# Patient Record
Sex: FEMALE | Race: WHITE | HISPANIC OR LATINO | Employment: UNEMPLOYED | URBAN - METROPOLITAN AREA
[De-identification: names, ages, dates, MRNs, and addresses within clinical notes are randomized per-mention and may not be internally consistent; named-entity substitution may affect disease eponyms.]

---

## 2019-06-11 ENCOUNTER — HOSPITAL ENCOUNTER (EMERGENCY)
Facility: HOSPITAL | Age: 39
Discharge: HOME | End: 2019-06-11
Attending: EMERGENCY MEDICINE
Payer: COMMERCIAL

## 2019-06-11 VITALS
RESPIRATION RATE: 16 BRPM | TEMPERATURE: 98.1 F | HEART RATE: 77 BPM | OXYGEN SATURATION: 100 % | SYSTOLIC BLOOD PRESSURE: 131 MMHG | WEIGHT: 174.16 LBS | DIASTOLIC BLOOD PRESSURE: 88 MMHG

## 2019-06-11 DIAGNOSIS — L29.9 ITCHING: Primary | ICD-10-CM

## 2019-06-11 DIAGNOSIS — B88.8 INFESTATION BY BED BUG: ICD-10-CM

## 2019-06-11 PROCEDURE — 99283 EMERGENCY DEPT VISIT LOW MDM: CPT | Performed by: EMERGENCY MEDICINE

## 2019-06-11 PROCEDURE — 99282 EMERGENCY DEPT VISIT SF MDM: CPT

## 2019-06-11 RX ORDER — DIAPER,BRIEF,INFANT-TODD,DISP
EACH MISCELLANEOUS
Qty: 15 G | Refills: 0 | Status: SHIPPED | OUTPATIENT
Start: 2019-06-11 | End: 2019-08-21

## 2019-06-11 RX ORDER — DIPHENHYDRAMINE HCL 25 MG
50 TABLET ORAL ONCE
Status: COMPLETED | OUTPATIENT
Start: 2019-06-11 | End: 2019-06-11

## 2019-06-11 RX ORDER — PREDNISONE 20 MG/1
40 TABLET ORAL DAILY
Qty: 10 TABLET | Refills: 0 | Status: SHIPPED | OUTPATIENT
Start: 2019-06-11 | End: 2019-06-15

## 2019-06-11 RX ORDER — PREDNISONE 20 MG/1
40 TABLET ORAL ONCE
Status: COMPLETED | OUTPATIENT
Start: 2019-06-11 | End: 2019-06-11

## 2019-06-11 RX ORDER — DIPHENHYDRAMINE HCL 25 MG
50 TABLET ORAL EVERY 8 HOURS PRN
Qty: 20 TABLET | Refills: 0 | Status: SHIPPED | OUTPATIENT
Start: 2019-06-11 | End: 2019-07-10 | Stop reason: SDUPTHER

## 2019-06-11 RX ADMIN — DIPHENHYDRAMINE HCL 50 MG: 25 TABLET, FILM COATED ORAL at 05:08

## 2019-06-11 RX ADMIN — PREDNISONE 40 MG: 20 TABLET ORAL at 05:08

## 2019-06-15 ENCOUNTER — OFFICE VISIT (OUTPATIENT)
Dept: URGENT CARE | Facility: MEDICAL CENTER | Age: 39
End: 2019-06-15
Payer: COMMERCIAL

## 2019-06-15 VITALS
TEMPERATURE: 98 F | RESPIRATION RATE: 16 BRPM | SYSTOLIC BLOOD PRESSURE: 117 MMHG | OXYGEN SATURATION: 99 % | DIASTOLIC BLOOD PRESSURE: 58 MMHG | HEART RATE: 69 BPM | WEIGHT: 175 LBS

## 2019-06-15 DIAGNOSIS — L23.5 ALLERGIC DERMATITIS DUE TO OTHER CHEMICAL PRODUCT: Primary | ICD-10-CM

## 2019-06-15 PROCEDURE — G0382 LEV 3 HOSP TYPE B ED VISIT: HCPCS | Performed by: PHYSICIAN ASSISTANT

## 2019-06-15 PROCEDURE — 99203 OFFICE O/P NEW LOW 30 MIN: CPT | Performed by: PHYSICIAN ASSISTANT

## 2019-06-15 PROCEDURE — 99283 EMERGENCY DEPT VISIT LOW MDM: CPT | Performed by: PHYSICIAN ASSISTANT

## 2019-06-15 RX ORDER — RANITIDINE 300 MG/1
300 CAPSULE ORAL EVERY EVENING
Qty: 14 CAPSULE | Refills: 0 | Status: SHIPPED | OUTPATIENT
Start: 2019-06-15 | End: 2019-08-21

## 2019-06-15 RX ORDER — PREDNISONE 10 MG/1
TABLET ORAL
Qty: 30 TABLET | Refills: 0 | Status: SHIPPED | OUTPATIENT
Start: 2019-06-15 | End: 2019-08-21

## 2019-07-10 ENCOUNTER — OFFICE VISIT (OUTPATIENT)
Dept: FAMILY MEDICINE CLINIC | Facility: CLINIC | Age: 39
End: 2019-07-10

## 2019-07-10 ENCOUNTER — TRANSCRIBE ORDERS (OUTPATIENT)
Dept: LAB | Facility: CLINIC | Age: 39
End: 2019-07-10

## 2019-07-10 ENCOUNTER — APPOINTMENT (OUTPATIENT)
Dept: LAB | Facility: CLINIC | Age: 39
End: 2019-07-10
Payer: COMMERCIAL

## 2019-07-10 VITALS
HEART RATE: 72 BPM | SYSTOLIC BLOOD PRESSURE: 120 MMHG | DIASTOLIC BLOOD PRESSURE: 80 MMHG | WEIGHT: 178 LBS | RESPIRATION RATE: 18 BRPM | TEMPERATURE: 97 F | BODY MASS INDEX: 31.54 KG/M2 | HEIGHT: 63 IN | OXYGEN SATURATION: 99 %

## 2019-07-10 DIAGNOSIS — Z00.00 HEALTHCARE MAINTENANCE: ICD-10-CM

## 2019-07-10 DIAGNOSIS — L23.5 ALLERGIC DERMATITIS DUE TO OTHER CHEMICAL PRODUCT: Primary | ICD-10-CM

## 2019-07-10 DIAGNOSIS — L29.9 ITCHING: ICD-10-CM

## 2019-07-10 DIAGNOSIS — Z12.4 CERVICAL CANCER SCREENING: ICD-10-CM

## 2019-07-10 PROBLEM — L25.9 DERMATITIS VENENATA: Status: ACTIVE | Noted: 2019-07-10

## 2019-07-10 LAB
ALBUMIN SERPL BCP-MCNC: 3.6 G/DL (ref 3.5–5)
ALP SERPL-CCNC: 78 U/L (ref 46–116)
ALT SERPL W P-5'-P-CCNC: 26 U/L (ref 12–78)
ANION GAP SERPL CALCULATED.3IONS-SCNC: 6 MMOL/L (ref 4–13)
AST SERPL W P-5'-P-CCNC: 24 U/L (ref 5–45)
BASOPHILS # BLD AUTO: 0.06 THOUSANDS/ΜL (ref 0–0.1)
BASOPHILS NFR BLD AUTO: 1 % (ref 0–1)
BILIRUB SERPL-MCNC: 0.47 MG/DL (ref 0.2–1)
BUN SERPL-MCNC: 10 MG/DL (ref 5–25)
CALCIUM SERPL-MCNC: 8.9 MG/DL (ref 8.3–10.1)
CHLORIDE SERPL-SCNC: 108 MMOL/L (ref 100–108)
CHOLEST SERPL-MCNC: 221 MG/DL (ref 50–200)
CO2 SERPL-SCNC: 27 MMOL/L (ref 21–32)
CREAT SERPL-MCNC: 0.94 MG/DL (ref 0.6–1.3)
EOSINOPHIL # BLD AUTO: 0.09 THOUSAND/ΜL (ref 0–0.61)
EOSINOPHIL NFR BLD AUTO: 2 % (ref 0–6)
ERYTHROCYTE [DISTWIDTH] IN BLOOD BY AUTOMATED COUNT: 13.1 % (ref 11.6–15.1)
EST. AVERAGE GLUCOSE BLD GHB EST-MCNC: 117 MG/DL
GFR SERPL CREATININE-BSD FRML MDRD: 77 ML/MIN/1.73SQ M
GLUCOSE P FAST SERPL-MCNC: 85 MG/DL (ref 65–99)
HBA1C MFR BLD: 5.7 % (ref 4.2–6.3)
HCT VFR BLD AUTO: 43.1 % (ref 34.8–46.1)
HDLC SERPL-MCNC: 30 MG/DL (ref 40–60)
HGB BLD-MCNC: 13.5 G/DL (ref 11.5–15.4)
IMM GRANULOCYTES # BLD AUTO: 0.01 THOUSAND/UL (ref 0–0.2)
IMM GRANULOCYTES NFR BLD AUTO: 0 % (ref 0–2)
LDLC SERPL CALC-MCNC: 116 MG/DL (ref 0–100)
LYMPHOCYTES # BLD AUTO: 1.79 THOUSANDS/ΜL (ref 0.6–4.47)
LYMPHOCYTES NFR BLD AUTO: 30 % (ref 14–44)
MCH RBC QN AUTO: 30.7 PG (ref 26.8–34.3)
MCHC RBC AUTO-ENTMCNC: 31.3 G/DL (ref 31.4–37.4)
MCV RBC AUTO: 98 FL (ref 82–98)
MONOCYTES # BLD AUTO: 0.52 THOUSAND/ΜL (ref 0.17–1.22)
MONOCYTES NFR BLD AUTO: 9 % (ref 4–12)
NEUTROPHILS # BLD AUTO: 3.49 THOUSANDS/ΜL (ref 1.85–7.62)
NEUTS SEG NFR BLD AUTO: 58 % (ref 43–75)
NONHDLC SERPL-MCNC: 191 MG/DL
NRBC BLD AUTO-RTO: 0 /100 WBCS
PLATELET # BLD AUTO: 346 THOUSANDS/UL (ref 149–390)
PMV BLD AUTO: 10.1 FL (ref 8.9–12.7)
POTASSIUM SERPL-SCNC: 3.8 MMOL/L (ref 3.5–5.3)
PROT SERPL-MCNC: 7.8 G/DL (ref 6.4–8.2)
RBC # BLD AUTO: 4.4 MILLION/UL (ref 3.81–5.12)
SODIUM SERPL-SCNC: 141 MMOL/L (ref 136–145)
TRIGL SERPL-MCNC: 375 MG/DL
TSH SERPL DL<=0.05 MIU/L-ACNC: 1.94 UIU/ML (ref 0.36–3.74)
WBC # BLD AUTO: 5.96 THOUSAND/UL (ref 4.31–10.16)

## 2019-07-10 PROCEDURE — 84443 ASSAY THYROID STIM HORMONE: CPT

## 2019-07-10 PROCEDURE — 99203 OFFICE O/P NEW LOW 30 MIN: CPT | Performed by: PHYSICIAN ASSISTANT

## 2019-07-10 PROCEDURE — 80061 LIPID PANEL: CPT

## 2019-07-10 PROCEDURE — 36415 COLL VENOUS BLD VENIPUNCTURE: CPT

## 2019-07-10 PROCEDURE — 80053 COMPREHEN METABOLIC PANEL: CPT

## 2019-07-10 PROCEDURE — 85025 COMPLETE CBC W/AUTO DIFF WBC: CPT

## 2019-07-10 PROCEDURE — 83036 HEMOGLOBIN GLYCOSYLATED A1C: CPT

## 2019-07-10 PROCEDURE — 3725F SCREEN DEPRESSION PERFORMED: CPT | Performed by: PHYSICIAN ASSISTANT

## 2019-07-10 RX ORDER — DIPHENHYDRAMINE HCL 25 MG
50 TABLET ORAL EVERY 8 HOURS PRN
Qty: 30 TABLET | Refills: 0 | Status: SHIPPED | OUTPATIENT
Start: 2019-07-10 | End: 2019-08-21

## 2019-07-10 NOTE — PROGRESS NOTES
Subjective:     Genny Gonsalez is a 44 y o  female who  has no past medical history on file  who presented to the office today to establish care  - Patient is a 79-year-old female who presents today to establish care  Patient was previously seen a doctor when she was living in [facility], but she has yet to establish with a PCP in [facility]  Patient denies any known chronic medical problems  Patient denies taking any daily medications, vitamins, or supplements  Patient denies any known allergies  Patient had prior tubal ligation surgery but denies any other surgeries  - Patient presented to [facility] ED on 2019 for diffuse itching  Patient woke up with the Red bites which were itchy  She notes her bed was sprayed for bedbugs and her  does not have any bites  Patient was diagnosed with infestation by bedbugs and was prescribed prednisone, Benadryl, and hydrocortisone 1% cream     - Patient then presented to urgent care on 06/15/2019 for generalized body rash  It was then believed the rash was due to a new detergent that patient was using  Patient was given prednisone and loratadine  Today, patient notes her rash is improving but is still there  She notes it is still itchy at times  She notes she has finished her medications that she was given  Patient notes she is wondering when all the marks will go away  Dental Regular Visits: Yes    Vision Problems: Yes; wears contacts  Follows with eye doctor regularly  Last Vision Examination: 2019  Hearing Loss: No    Life Style  Healthy Diet: Moderate  Regular Exercise: Yes; walks 40 minutes every morning  Weight Concerns: No  Tobacco Use: No  Alcohol Use: No  Drug Use: No  Career: Currently not working       Reproductive Health  Sexually Active: Not currently  Contraception: Tubal ligation   Menstrual Problems: No; menses are regular, last 5-6 days, with regular flow  LMP: 19  OB History:        Breast Cancer Screening:  - Risks and benefits discussed  Patient does not yet meet the requirements to complete this screening  Osteoporosis Screening:  - Risks and benefits discussed  Patient does not yet meet the requirements to complete this screening  Colorectal Cancer Screening:   - Risks and benefits discussed  Patient does not yet meet the requirements to complete this screening  Cervical Cancer Screening:  - Risks and benefits discussed  Has not establshed yet here with an OB/GYN  Patient denies any history of abnormal PAPs  STD Testing:  - Risks and benefits discussed  Current Outpatient Medications on File Prior to Visit   Medication Sig Dispense Refill    [DISCONTINUED] loratadine (CLARITIN REDITABS) 10 MG dissolvable tablet Take 1 tablet (10 mg total) by mouth daily 14 tablet 0    hydrocortisone 1 % cream Apply to affected area 2 times daily as needed (Patient not taking: Reported on 7/10/2019) 15 g 0    predniSONE 10 mg tablet 5 x 4 days, 4 x 1, 3 x 1, 2 x 1, 1 x 1 (Patient not taking: Reported on 7/10/2019) 30 tablet 0    ranitidine (ZANTAC) 300 MG capsule Take 1 capsule (300 mg total) by mouth every evening (Patient not taking: Reported on 7/10/2019) 14 capsule 0    [DISCONTINUED] diphenhydrAMINE (BENADRYL) 25 mg tablet Take 2 tablets (50 mg total) by mouth every 8 (eight) hours as needed for itching (Patient not taking: Reported on 7/10/2019) 20 tablet 0     No current facility-administered medications on file prior to visit  History reviewed  No pertinent past medical history    Past Surgical History:   Procedure Laterality Date    TUBAL LIGATION       Social History     Socioeconomic History    Marital status: Single     Spouse name: None    Number of children: 2    Years of education: None    Highest education level: None   Occupational History    None   Social Needs    Financial resource strain: None    Food insecurity:     Worry: None     Inability: None    Transportation needs: Medical: None     Non-medical: None   Tobacco Use    Smoking status: Never Smoker    Smokeless tobacco: Never Used   Substance and Sexual Activity    Alcohol use: Not Currently    Drug use: Not Currently    Sexual activity: None   Lifestyle    Physical activity:     Days per week: None     Minutes per session: None    Stress: None   Relationships    Social connections:     Talks on phone: None     Gets together: None     Attends Orthodox service: None     Active member of club or organization: None     Attends meetings of clubs or organizations: None     Relationship status: None    Intimate partner violence:     Fear of current or ex partner: None     Emotionally abused: None     Physically abused: None     Forced sexual activity: None   Other Topics Concern    None   Social History Narrative    None     History reviewed  No pertinent family history  Review of Systems   Constitutional: Negative for fatigue and fever  HENT: Negative for congestion, ear pain, rhinorrhea and sore throat  Eyes: Negative for pain  Respiratory: Negative for cough, chest tightness and shortness of breath  Cardiovascular: Negative for chest pain, palpitations and leg swelling  Gastrointestinal: Negative for abdominal pain, constipation, diarrhea, nausea and vomiting  Genitourinary: Negative for difficulty urinating and dysuria  Musculoskeletal: Negative for arthralgias  Skin: Positive for rash  Neurological: Negative for dizziness, numbness and headaches  Psychiatric/Behavioral: Negative for behavioral problems  The patient is not nervous/anxious  Objective:  /80   Pulse 72   Temp (!) 97 °F (36 1 °C) (Skin)   Resp 18   Ht 5' 3" (1 6 m)   Wt 80 7 kg (178 lb)   LMP 06/16/2019   SpO2 99%   BMI 31 53 kg/m²     Physical Exam   Constitutional: She is oriented to person, place, and time  She appears well-developed and well-nourished  No distress     HENT:   Head: Normocephalic and atraumatic  Right Ear: External ear normal    Left Ear: External ear normal    Nose: Nose normal    Mouth/Throat: Uvula is midline, oropharynx is clear and moist and mucous membranes are normal    Eyes: Pupils are equal, round, and reactive to light  Conjunctivae and EOM are normal  Right eye exhibits no discharge  Left eye exhibits no discharge  Neck: Normal range of motion  Neck supple  Cardiovascular: Normal rate, regular rhythm, normal heart sounds and intact distal pulses  No murmur heard  Pulmonary/Chest: Effort normal and breath sounds normal  She has no wheezes  Abdominal: Soft  Bowel sounds are normal  There is no tenderness  Musculoskeletal: Normal range of motion  She exhibits no edema  Neurological: She is alert and oriented to person, place, and time  She has normal strength  No cranial nerve deficit or sensory deficit  Skin: Skin is warm and dry  Capillary refill takes less than 2 seconds  Rash noted  Multiple lesions spread diffusely on bilateral arms and bilateral legs  Some lesions have crusted blood  Psychiatric: She has a normal mood and affect  Her speech is normal and behavior is normal    Nursing note and vitals reviewed  Assessment/Plan:   - Will order blood work - CBC, CMP, HgbA1c, lipid panel, TSH  Allergic dermatitis  - Explained to patient it will take some time before the marks resolve  Advised to stay away from the new detergent she was previously using     - Will refill Claritin 10 mg to be taken once daily  Will prescribe hydrocortisone 2 5% cream to be applied twice daily to affected areas as needed to help relieve redness, swelling and itchiness  Will refill Benadryl to be taken as needed for itchiness   - Advised to return to clinic if symptoms persist, worsen, or new symptoms arise  Cervical cancer screening  - Will refer to OB/GYN for annual women's wellness exams        Return in about 6 months (around 1/10/2020) for Next scheduled follow up       Diagnoses and all orders for this visit:    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  -     hydrocortisone 2 5 % cream; Apply topically 2 (two) times a day  -     loratadine (CLARITIN REDITABS) 10 MG dissolvable tablet; Take 1 tablet (10 mg total) by mouth daily    Healthcare maintenance  -     CBC and differential; Future  -     Comprehensive metabolic panel; Future  -     Lipid panel; Future  -     TSH, 3rd generation with Free T4 reflex; Future  -     Hemoglobin A1C; Future    Cervical cancer screening  -     Ambulatory referral to Obstetrics / Gynecology; Future    Itching  -     diphenhydrAMINE (BENADRYL) 25 mg tablet; Take 2 tablets (50 mg total) by mouth every 8 (eight) hours as needed for itching        All of patients questions were answered  Patient understands and agrees with the above plan       Stephen Gold PA-C  07/10/19  JAXON Sorensen

## 2019-07-10 NOTE — PATIENT INSTRUCTIONS
Sarpullido marc   LO QUE NECESITA SABER:   Un salpullido es la irritación, enrojecimiento o comezón de la piel o de las membranas mucosas  Las [employer] mucosas son las áreas hamzah el revestimiento de yusuf nariz o garganta  Marc significa que el salpullido comienza repentinamente, que empeora rápidamente y que dura poco tiempo  El salpullido marc podría ser provocado por pat enfermedad, hamzah la hepatitis o la vasculitis  El salpullido podría ser Margueritte Jung reacción a algo a lo que usted es alérgico, hamzah al látex  Ciertos medicamentos, incluyendo los antibióticos, los AINEs, los medicamentos prescritos para el dolor y la aspirina también pueden provocar un salpullido  INSTRUCCIONES SOBRE EL ARASELI HOSPITALARIA:   Regrese a la rah de emergencias si:   · Tiene dolor en el pecho o dificultad repentina para respirar  · Usted está vomitando, tiene dolor de Lauree Ax o muscular y yusuf garganta está adolorida  Pregúntele a yusuf [facility] vitaminas y minerales son adecuados para usted  · Usted tiene fiebre  · Mimi heridas se abren debido a que se está rascando yusuf piel o usted tiene Margueritte Jung herida que está kwame, [place] o adolorida  · Yusuf sarpullido dura más de 3 meses  · Usted tiene inflamación o dolor en mimi articulaciones  · Usted tiene preguntas o inquietudes acerca de yusuf condición o cuidado  Medicamentos:  Si yusuf salpullido no desaparece por sí solo, usted podría necesitar los siguientes medicamentos:  · Los antihistamínicos  podrían administrarse para disminuir la comezón  · Esteroides  podría administrarse para disminuir la inflamación  · Antibióticos  ayudan a combatir o a evitar pat infección bacteriana  · [place] mimi medicamentos hamzah se le haya indicado  Consulte con yusuf médico si usted luan que yusuf medicamento no le está ayudando o si presenta efectos secundarios  Infórmele si es alérgico a algún medicamento   Mantenga pat lista actualizada de los [place], las vitaminas y los productos herbales que Inderjit Coad los siguientes datos de los medicamentos: cantidad, frecuencia y motivo de administración  Traiga con usted la lista o los envases de la píldoras a mimi citas de seguimiento  Lleve la lista de los medicamentos con usted en alonso de pat emergencia  Evite un salpullido o cuide yusuf piel cuando tenga salpullido:  La piel reseca puede generar más problemas  No se rasque yusuf piel si tiene comezón  Usted podría provocar pat infección en la piel si se rasca  Lo siguiente podría evitar que se reseque yusuf piel y podría ayudar a que se danette mejor:  · Use cremas espesas o vaselina para ayudar a aliviar yusuf salpullido  Estos productos funcionan ruthy en áreas con la piel gruesa, hamzah en mimi pies  Las compresas de agua fría también podrían usarse para aliviar yusuf piel  Aplique pat compresa de agua fría o use pat toalla mojada con agua fría y después cúbrala con pat toalla seca  · Use agua tibia para bañarse  [place] puede dañar más yusuf piel  Séquese la piel con palmaditas suaves  No se frote yusuf piel con la toalla  · Use detergentes, jabones, champú y burbujas para bañarse que estén hechos para piel sensible  Use ropa que esté hecha de algodón en vez de naylon o de cayla  El algodón es [address], así que no dañará tanto yusuf piel  Acuda a mimi consultas de control con yusuf médico según le indicaron  Es posible que usted necesite hanna a un dermatólogo si otros médicos no saben lo que le está provocando el salpullido  Usted también podría necesitar hanna a un dermatólogo si yusuf salpullido no mejora aun con tratamiento  Usted podría necesitar hanna a un dietista si tiene alergias a los alimentos  Anote mimi preguntas para que se acuerde de hacerlas ethan mimi visitas  © 2017 [address] [employer] Information is for End User's use only and may not be sold, redistributed or otherwise used for commercial purposes   All illustrations and images included in CareNotes® are the copyrighted property of A D A M , Inc  or Alejandro Stephon  Esta información es sólo para uso en educación  Yusuf intención no es darle un consejo médico sobre enfermedades o tratamientos  Colsulte con yusuf Melven Rimes farmacéutico antes de seguir cualquier régimen médico para saber si es seguro y efectivo para usted

## 2019-07-25 ENCOUNTER — TELEPHONE (OUTPATIENT)
Dept: OBGYN CLINIC | Facility: CLINIC | Age: 39
End: 2019-07-25

## 2019-07-31 ENCOUNTER — TELEPHONE (OUTPATIENT)
Dept: FAMILY MEDICINE CLINIC | Facility: CLINIC | Age: 39
End: 2019-07-31

## 2019-07-31 NOTE — TELEPHONE ENCOUNTER
Contacted pt regarding results for her son and she then requested contact info for GYN upstairs because she lost her wallet and everything in it, including their info and knows she has missed her appt  I provided pt with phone number [phone] for GYN upstairs

## 2019-08-21 ENCOUNTER — OFFICE VISIT (OUTPATIENT)
Dept: OBGYN CLINIC | Facility: CLINIC | Age: 39
End: 2019-08-21

## 2019-08-21 VITALS
SYSTOLIC BLOOD PRESSURE: 120 MMHG | HEIGHT: 63 IN | WEIGHT: 178.2 LBS | BODY MASS INDEX: 31.57 KG/M2 | DIASTOLIC BLOOD PRESSURE: 60 MMHG

## 2019-08-21 DIAGNOSIS — B37.9 YEAST INFECTION: Primary | ICD-10-CM

## 2019-08-21 LAB — SL AMB POCT WET MOUNT: ABNORMAL

## 2019-08-21 PROCEDURE — 99202 OFFICE O/P NEW SF 15 MIN: CPT | Performed by: OBSTETRICS & GYNECOLOGY

## 2019-08-21 PROCEDURE — 87210 SMEAR WET MOUNT SALINE/INK: CPT | Performed by: OBSTETRICS & GYNECOLOGY

## 2019-08-21 RX ORDER — FLUCONAZOLE 150 MG/1
150 TABLET ORAL ONCE
Qty: 1 TABLET | Refills: 0 | Status: SHIPPED | OUTPATIENT
Start: 2019-08-21 | End: 2019-08-21

## 2020-03-16 PROBLEM — Z00.00 ENCOUNTER FOR SCREENING AND PREVENTATIVE CARE: Status: ACTIVE | Noted: 2020-03-16

## 2020-03-16 PROBLEM — E78.2 MIXED HYPERLIPIDEMIA: Status: ACTIVE | Noted: 2020-03-16

## 2020-03-16 NOTE — ASSESSMENT & PLAN NOTE
Mammogram: The USPSTF recommends biennial screening mammography for women 50-74 years  The decision to start regular, biennial screening mammography before the age of 48 years should be an individual one and take patient context into account, including the patient's values regarding specific benefits and harms  Colonoscopy: No eligible until 48 yeas of age  No family history of colon cancer  Dexa scan: Not eligible until 72years of age     Annual wellness exam: Needs pap exam with co-testing  Denies any prior abnormal pap exams

## 2020-03-16 NOTE — PROGRESS NOTES
Assessment/Plan:    Encounter for screening and preventative care  Mammogram: The USPSTF recommends biennial screening mammography for women 50-74 years  The decision to start regular, biennial screening mammography before the age of 48 years should be an individual one and take patient context into account, including the patient's values regarding specific benefits and harms  Colonoscopy: No eligible until 48 yeas of age  No family history of colon cancer  Dexa scan: Not eligible until 72years of age     Annual wellness exam: Needs pap exam with co-testing  Denies any prior abnormal pap exams  Mixed hyperlipidemia  ASCVD Risk:2 %  Given her unremarkable medical history, age and benign family history reinforced the need to lose weight and lifestyle modifications such as diet change and exercise  BMI 32 0-32 9,adult  BMI Counseling: Body mass index is 32 77 kg/m²  The BMI is above normal  Nutrition recommendations include reducing portion sizes, decreasing overall calorie intake, 3-5 servings of fruits/vegetables daily, reducing fast food intake and consuming healthier snacks  Exercise recommendations include exercising 3-5 times per week  Patient agreeable to lose weight and will get a trademill at home to increase her daily exercise   Healthy lifestyle in [place] attached to AVS     Prediabetes  Current HbA1c 5 7  Discussed with patient regarding lifestyle modifications and exercise regimen, agreeable   Follow up in 6 months     Allergic rhinitis  Ongoing nasal congestion and rhinorrhea   Refilled Claritin 10 mg daily   Avoid allergens          Diagnoses and all orders for this visit:    Encounter for screening and preventative care    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  -     loratadine (CLARITIN REDITABS) 10 MG dissolvable tablet;  Take 1 tablet (10 mg total) by mouth daily    Encounter for administration of vaccine  -     influenza vaccine, [id number], quadrivalent, 0 5 mL, preservative-free, for adult and pediatric patients 6 mos+ (AFLURIA, FLUARIX, FLULAVAL, FLUZONE)    Encounter for hepatitis C screening test for low risk patient  -     Hepatitis C antibody; Future    Encounter for screening for HIV  -     Rapid HIV 1/2 AB-AG Combo; Future    Prediabetes    BMI 32 0-32 9,adult    Mixed hyperlipidemia    Allergic rhinitis, unspecified seasonality, unspecified trigger          Subjective:      Patient ID: Stephanie Lane is a 36 y o  female who presents today to the office for lab results follow up  HPI     Patient states she is overall doing well  She has a past medical history consistent with dermatitis venenata and allergic rhinitis  Her only daily medication is Claritin 10 mg  States she needs a refill today as her allergies have been acting up  Social hx: Denies EtOH, tobacco, and illicit dug use  Occupation: Not currently employed   job in the past   Children: Girl 3years old, and boy 6years old  Exercise: 3-5 times/week, walking  Family hx:   Mother-DMT2  Father-Unsure, passed away when she was young  Siblings- One brother with neuropathy    Immunizations:  Influenza: in March, 2019  Needs shot today  Tdap: October, 2019  Patient will bring copy of her immunization to next visit  Pap exam: About 1 5 years ago  No abnormal pap exams in the past  She will bring records  The following portions of the patient's history were reviewed and updated as appropriate: allergies, current medications, past family history, past medical history, past social history, past surgical history and problem list     Review of Systems   Constitutional: Negative for chills, fatigue and fever  HENT: Positive for congestion  Negative for sore throat and trouble swallowing  Chronic congestion, ran out of Claritin   Eyes: Negative for visual disturbance  Respiratory: Negative for cough and shortness of breath      Cardiovascular: Negative for chest pain, palpitations and leg swelling  Gastrointestinal: Negative for abdominal distention, abdominal pain, blood in stool, constipation, diarrhea, nausea and vomiting  Genitourinary: Negative for dysuria and hematuria  Musculoskeletal: Negative for gait problem  Skin: Negative for rash  Neurological: Negative for dizziness, weakness and headaches  Psychiatric/Behavioral: Negative for agitation  Objective:      /90 (BP Location: Left arm, Patient Position: Sitting, Cuff Size: Standard)   Pulse 78   Temp (!) 97 1 °F (36 2 °C) (Temporal)   Resp 14   Wt 83 9 kg (185 lb)   LMP 03/08/2020   SpO2 98%   BMI 32 77 kg/m²          Physical Exam   Constitutional: She is oriented to person, place, and time  She appears well-developed and well-nourished  No distress  HENT:   Head: Normocephalic and atraumatic  Nose: Nose normal    Mouth/Throat: Oropharynx is clear and moist    Eyes: Conjunctivae and EOM are normal    Neck: Normal range of motion  Neck supple  Cardiovascular: Normal rate, regular rhythm and normal heart sounds  Pulmonary/Chest: Effort normal and breath sounds normal  No respiratory distress  Abdominal: Soft  Bowel sounds are normal  She exhibits no distension  There is no tenderness  There is no guarding  Musculoskeletal: Normal range of motion  She exhibits no edema or tenderness  Neurological: She is alert and oriented to person, place, and time  Skin: Skin is warm  No rash noted  She is not diaphoretic  Psychiatric: She has a normal mood and affect  Her behavior is normal  Judgment and thought content normal    Nursing note and vitals reviewed

## 2020-03-16 NOTE — ASSESSMENT & PLAN NOTE
ASCVD Risk:2 %  Given her unremarkable medical history, age and benign family history reinforced the need to lose weight and lifestyle modifications such as diet change and exercise

## 2020-03-17 ENCOUNTER — OFFICE VISIT (OUTPATIENT)
Dept: FAMILY MEDICINE CLINIC | Facility: CLINIC | Age: 40
End: 2020-03-17

## 2020-03-17 VITALS
DIASTOLIC BLOOD PRESSURE: 90 MMHG | RESPIRATION RATE: 14 BRPM | SYSTOLIC BLOOD PRESSURE: 130 MMHG | BODY MASS INDEX: 32.77 KG/M2 | OXYGEN SATURATION: 98 % | TEMPERATURE: 97.1 F | HEART RATE: 78 BPM | WEIGHT: 185 LBS

## 2020-03-17 DIAGNOSIS — Z11.59 ENCOUNTER FOR HEPATITIS C SCREENING TEST FOR LOW RISK PATIENT: ICD-10-CM

## 2020-03-17 DIAGNOSIS — Z11.4 ENCOUNTER FOR SCREENING FOR HIV: ICD-10-CM

## 2020-03-17 DIAGNOSIS — R73.03 PREDIABETES: ICD-10-CM

## 2020-03-17 DIAGNOSIS — Z23 ENCOUNTER FOR ADMINISTRATION OF VACCINE: ICD-10-CM

## 2020-03-17 DIAGNOSIS — L23.5 ALLERGIC DERMATITIS DUE TO OTHER CHEMICAL PRODUCT: ICD-10-CM

## 2020-03-17 DIAGNOSIS — E78.2 MIXED HYPERLIPIDEMIA: ICD-10-CM

## 2020-03-17 DIAGNOSIS — Z00.00 ENCOUNTER FOR SCREENING AND PREVENTATIVE CARE: Primary | ICD-10-CM

## 2020-03-17 DIAGNOSIS — J30.9 ALLERGIC RHINITIS, UNSPECIFIED SEASONALITY, UNSPECIFIED TRIGGER: ICD-10-CM

## 2020-03-17 PROCEDURE — 90686 IIV4 VACC NO PRSV 0.5 ML IM: CPT | Performed by: FAMILY MEDICINE

## 2020-03-17 PROCEDURE — 99213 OFFICE O/P EST LOW 20 MIN: CPT | Performed by: FAMILY MEDICINE

## 2020-03-17 PROCEDURE — 90471 IMMUNIZATION ADMIN: CPT | Performed by: FAMILY MEDICINE

## 2020-03-17 PROCEDURE — 1036F TOBACCO NON-USER: CPT | Performed by: FAMILY MEDICINE

## 2020-03-17 PROCEDURE — T1015 CLINIC SERVICE: HCPCS | Performed by: FAMILY MEDICINE

## 2020-03-17 NOTE — ASSESSMENT & PLAN NOTE
Current HbA1c 5 7  Discussed with patient regarding lifestyle modifications and exercise regimen, agreeable   Follow up in 6 months

## 2020-03-17 NOTE — PATIENT INSTRUCTIONS
Lifestyle Medicine Tip Sheet- [place]    1  Coma alimentos predominantemente menos procesados, hamzah comida rápida, cenas de televisión y tocino  2  Coma cerca de la naturaleza (mercados de agricultores, productos frescos o congelados)    3  Coma pat dieta predominantemente basada en plantas  a  Verdes frondosos oscuros miguel   b  Frutas y vegetales  c   Granos integrales: peter integral, apenas, bayas de peter, quinua, hilaria cortada en yareli, arroz integral, pasta integral   d  Legumbres: frijoles, frijoles pintos, frijoles blancos, frijoles negros, garbanzos (garbanzos), frijoles lima (maduros, secos), arvejas, lentejas y edamame (frijoles de soya verdes)    life    4  Al menos la mitad del plato debe contener frutas o verduras  5  El líquido debe ser predominantemente agua (limite el refresco y [place])    6  Mary el tamaño de la porción  7  ¿Qué alimentos tee evitar o limitar? - Grasas: Específicamente saturadas y grasas trans  Se encuentran en margarinas, muchas comidas rápidas y algunos productos horneados comprados en la sita  Las grasas saturadas y grasas trans pueden elevar yusuf nivel de colesterol y yusuf probabilidad de contraer enfermedades cardíacas  - Cuando cocine, es mejor no usar aceites, abhishek si es necesario, trate de limitar la cantidad de aceite utilizado, ya que el aceite contiene muchas calorías por volumen y es muy poco saludable cuando se calienta ethan la cocción   - Azúcar: limite o evite el azúcar, los dulces y los granos refinados  Los granos refinados se encuentran en el pan carrero, el arroz carrero, la mayoría de las formas de pasta y la mayoría de los alimentos "aperitivos" envasados  - Intente no cocinar con antonio y evite agregar antonio adicional a mimi comidas  - Haylee Patel: los estudios grant demostrado que comer mucha lakhwinder Burrows riesgo de ciertos problemas de [place], hamzah enfermedades cardíacas y cáncer  8  7-9 horas de sueño    9   Ejercicio diario mínimo de 30 minutos (caminando alrededor de la ángel)    10  Socialización (amigos y familiares)    - Explora tu vecindario  Ve al parque, pasa tiempo en la biblioteca  Si está interesado, puede leer más sobre las opciones de alimentos saludables en los siguientes sitios web:  a  [employer]  org  b  Home cooking recipes: https://www [employer]/  c  http://sacha info/  d  Familydoctor  org

## 2020-06-26 ENCOUNTER — TELEPHONE (OUTPATIENT)
Dept: FAMILY MEDICINE CLINIC | Facility: CLINIC | Age: 40
End: 2020-06-26

## 2020-07-29 ENCOUNTER — ANNUAL EXAM (OUTPATIENT)
Dept: FAMILY MEDICINE CLINIC | Facility: CLINIC | Age: 40
End: 2020-07-29

## 2020-07-29 VITALS
TEMPERATURE: 98 F | RESPIRATION RATE: 18 BRPM | BODY MASS INDEX: 32.6 KG/M2 | WEIGHT: 184 LBS | HEART RATE: 82 BPM | HEIGHT: 63 IN | DIASTOLIC BLOOD PRESSURE: 70 MMHG | SYSTOLIC BLOOD PRESSURE: 120 MMHG | OXYGEN SATURATION: 99 %

## 2020-07-29 DIAGNOSIS — Z12.4 ENCOUNTER FOR SCREENING FOR CERVICAL CANCER: ICD-10-CM

## 2020-07-29 DIAGNOSIS — Z00.00 ENCOUNTER FOR WELLNESS EXAMINATION IN ADULT: Primary | ICD-10-CM

## 2020-07-29 PROCEDURE — G0145 SCR C/V CYTO,THINLAYER,RESCR: HCPCS | Performed by: FAMILY MEDICINE

## 2020-07-29 PROCEDURE — 87660 TRICHOMONAS VAGIN DIR PROBE: CPT | Performed by: FAMILY MEDICINE

## 2020-07-29 PROCEDURE — 87624 HPV HI-RISK TYP POOLED RSLT: CPT | Performed by: FAMILY MEDICINE

## 2020-07-29 PROCEDURE — 99396 PREV VISIT EST AGE 40-64: CPT | Performed by: FAMILY MEDICINE

## 2020-07-29 PROCEDURE — 87491 CHLMYD TRACH DNA AMP PROBE: CPT | Performed by: FAMILY MEDICINE

## 2020-07-29 PROCEDURE — 87480 CANDIDA DNA DIR PROBE: CPT | Performed by: FAMILY MEDICINE

## 2020-07-29 PROCEDURE — 87510 GARDNER VAG DNA DIR PROBE: CPT | Performed by: FAMILY MEDICINE

## 2020-07-29 PROCEDURE — 87591 N.GONORRHOEAE DNA AMP PROB: CPT | Performed by: FAMILY MEDICINE

## 2020-07-29 PROCEDURE — 3008F BODY MASS INDEX DOCD: CPT | Performed by: FAMILY MEDICINE

## 2020-07-29 NOTE — ASSESSMENT & PLAN NOTE
Due to concern for white creamy discharge, will swab for vaginosis   No treatment warranted at this time as pt is asymptomatic   Pap smear with HPV testing today   She would also like NG/Chlamydia testing   Will bring patient back for ap smear in 3 years with reflex to HPV if results come back nl  Return if new concerns or sx arise

## 2020-07-29 NOTE — PROGRESS NOTES
Assessment/Plan:    Encounter for wellness examination in adult  Due to concern for white creamy discharge, will swab for vaginosis   No treatment warranted at this time as pt is asymptomatic   Pap smear with HPV testing today  She would also like NG/Chlamydia testing   Will bring patient back for ap smear in 3 years with reflex to HPV if results come back nl  Return if new concerns or sx arise        Diagnoses and all orders for this visit:    Encounter for wellness examination in adult    Encounter for screening for cervical cancer   -     Liquid-based pap, screening  -     Chlamydia/GC amplified DNA by PCR  -     VAGINOSIS DNA PROBE (AFFIRM)    Other orders  -     Cancel: Mammo screening bilateral w cad; Future          Subjective:      Patient ID: Rhea Nichols is a 36 y o  female who presents today for annual well woman exam     HPI     SUBJECTIVE:   36 y o  female for annual routine Pap and checkup  Current Outpatient Medications   Medication Sig Dispense Refill    loratadine (CLARITIN REDITABS) 10 MG dissolvable tablet Take 1 tablet (10 mg total) by mouth daily 90 tablet 1     No current facility-administered medications for this visit  Allergies: Patient has no known allergies  Feeling well  No fevers, dyspnea or chest pain on exertion  No abdominal pain, change in bowel habits, black or bloody stools  GYN hx: normal menses, no abnormal bleeding, pelvic pain, no breast pain or new or enlarging lumps on self exam, she complains of intermittent creamy vaginal discharge since last year  States she used once a tampon at that time, and afterwards noted white vaginal discharge  She stopped using tampons after the fact  She went to see her previous doctor and was given one tablet to take that she cannot recall and then she tried Monistat for 3 days with some resolution  Denies vaginal pruritus, vaginal bleeding, pelvic pain, malodorous discharge and urinary sx  Sexual hx:  In a monogamous relationship for 15 yrs  Method of contraception: had tubal ligation done  The following portions of the patient's history were reviewed and updated as appropriate: allergies, current medications, past family history, past medical history, past social history, past surgical history and problem list     Review of Systems   Constitutional: Negative for chills, fatigue and fever  HENT: Negative for sore throat and trouble swallowing  Eyes: Negative for visual disturbance  Respiratory: Negative for cough and shortness of breath  Cardiovascular: Negative for chest pain and leg swelling  Gastrointestinal: Negative for abdominal pain, blood in stool, constipation, diarrhea, nausea and vomiting  Genitourinary: Positive for vaginal discharge  Negative for dysuria, flank pain, genital sores, hematuria, pelvic pain, vaginal bleeding and vaginal pain  Creamy white vaginal discharge, intermittent    Musculoskeletal: Negative for gait problem  Skin: Negative for rash  Neurological: Negative for dizziness, weakness and headaches  Psychiatric/Behavioral: Negative for agitation  Objective:      /70 (BP Location: Right arm, Patient Position: Sitting, Cuff Size: Standard)   Pulse 82   Temp 98 °F (36 7 °C) (Probe)   Resp 18   Ht 5' 3" (1 6 m)   Wt 83 5 kg (184 lb)   LMP 07/06/2020   SpO2 99%   BMI 32 59 kg/m²          Physical Exam   Constitutional: She is oriented to person, place, and time  She appears well-developed and well-nourished  HENT:   Head: Normocephalic and atraumatic  Nose: Nose normal    Mouth/Throat: Oropharynx is clear and moist    Eyes: Pupils are equal, round, and reactive to light  Conjunctivae and EOM are normal    Neck: Normal range of motion  Neck supple  Cardiovascular: Normal rate, regular rhythm and normal heart sounds     Pulmonary/Chest: Effort normal and breath sounds normal  Right breast exhibits no inverted nipple, no mass, no nipple discharge, no skin change and no tenderness  Left breast exhibits no inverted nipple, no mass, no nipple discharge, no skin change and no tenderness  Abdominal: Soft  Bowel sounds are normal  There is no tenderness  There is no guarding  Genitourinary: Pelvic exam was performed with patient supine  There is no rash, tenderness or lesion on the right labia  There is no rash, tenderness or lesion on the left labia  Cervix exhibits no discharge and no friability  Right adnexum displays no mass, no tenderness and no fullness  Left adnexum displays no mass, no tenderness and no fullness  No erythema, tenderness or bleeding in the vagina  No foreign body in the vagina  No signs of injury around the vagina  No vaginal discharge found  Musculoskeletal: She exhibits no edema  Neurological: She is alert and oriented to person, place, and time  Skin: Skin is warm  No rash noted  Psychiatric: She has a normal mood and affect

## 2020-08-01 LAB
C TRACH DNA SPEC QL NAA+PROBE: NEGATIVE
HPV HR 12 DNA CVX QL NAA+PROBE: NEGATIVE
HPV16 DNA CVX QL NAA+PROBE: NEGATIVE
HPV18 DNA CVX QL NAA+PROBE: NEGATIVE
N GONORRHOEA DNA SPEC QL NAA+PROBE: NEGATIVE

## 2020-08-02 LAB
CANDIDA RRNA VAG QL PROBE: NEGATIVE
G VAGINALIS RRNA GENITAL QL PROBE: NEGATIVE
T VAGINALIS RRNA GENITAL QL PROBE: POSITIVE

## 2020-08-04 ENCOUNTER — TELEPHONE (OUTPATIENT)
Dept: FAMILY MEDICINE CLINIC | Facility: CLINIC | Age: 40
End: 2020-08-04

## 2020-08-04 DIAGNOSIS — A59.9 TRICHOMONAS VAGINALIS INFECTION: Primary | ICD-10-CM

## 2020-08-04 RX ORDER — METRONIDAZOLE 500 MG/1
500 TABLET ORAL EVERY 12 HOURS SCHEDULED
Qty: 14 TABLET | Refills: 0 | Status: SHIPPED | OUTPATIENT
Start: 2020-08-04 | End: 2020-08-11

## 2020-08-04 NOTE — TELEPHONE ENCOUNTER
----- Message from Flaquita To MD sent at 8/4/2020  3:01 PM EDT -----  Hello,    Please call patient and schedule f/u appt for STD on August 21st    She will also need during this appointment vaginal exam to recheck for Trichomoniasis  Thank you!   Dr Vickie Gee

## 2020-08-04 NOTE — TELEPHONE ENCOUNTER
Called patient around 1430 to discuss vaginosis test results  Had a discussion at length with Lamberto Parvez regarding Trichomonas vaginalis infection and mode of transmission  States she has been in a monogamous relationship with her partner for long term, about 15 years  She is taken by surprise hearing the result  Discussed that she needs 7 days of Metronidazole treatment 500 mg BID  Medication ordered and sent to her pharmacy of choice  Reviewed adverse effects while on the medication such as metallic taste and avoidance of alcoholic beverages  Advised patient to refrain from sexual intercourse throughout the duration of the treatment  I've also let Lamberto Hannon know that her partner needs to be treated, and to either call his PCP or go to [facility] tomorrow morning to receive treatment  Per Centers for Disease Control and Prevention recommendation she will need to schedule an appointment in 2-3 weeks s/p completion of treatment to repeat testing  Patient verbalized understanding and had no other concerns or questions at this time  She mentioned will call today SW office to schedule f/u appt

## 2020-08-05 LAB
LAB AP GYN PRIMARY INTERPRETATION: NORMAL
Lab: NORMAL
PATH INTERP SPEC-IMP: NORMAL

## 2020-08-05 NOTE — RESULT ENCOUNTER NOTE
Reviewed pap exam, negative for lesion and maligancy  Please call patient and let her know  Thank you!

## 2020-09-15 NOTE — PROGRESS NOTES
Assessment/Plan:     No problem-specific Assessment & Plan notes found for this encounter  Diagnoses and all orders for this visit:    Yeast infection  -     fluconazole (DIFLUCAN) 150 mg tablet; Take 1 tablet (150 mg total) by mouth once for 1 dose Repeat in 72 hrs if still symptomatic      RTO for annual exam         Subjective:      Patient ID: Grant Ornelas is a 44 y o  female c/o vaginal itching and discharge  Denies odor  She has not tried any OTC medication  She thought it was brought on by using tampons  HPI    The following portions of the patient's history were reviewed and updated as appropriate: allergies, current medications, past family history, past medical history, past social history, past surgical history and problem list     Review of Systems      Objective:      /60   Ht 5' 3" (1 6 m)   Wt 80 8 kg (178 lb 3 2 oz)   LMP 08/11/2019 (Exact Date)   BMI 31 57 kg/m²          Physical Exam   Constitutional: She is oriented to person, place, and time  She appears well-developed and well-nourished  No distress  Pulmonary/Chest: Effort normal    Genitourinary: There is no rash, tenderness, lesion or injury on the right labia  There is no rash, tenderness, lesion or injury on the left labia  There is erythema in the vagina  No tenderness or bleeding in the vagina  No foreign body in the vagina  No signs of injury around the vagina  Vaginal discharge found  Neurological: She is alert and oriented to person, place, and time  Psychiatric: She has a normal mood and affect  Her behavior is normal    Nursing note and vitals reviewed 
(1) Outpatient Area

## 2020-09-30 ENCOUNTER — OFFICE VISIT (OUTPATIENT)
Dept: FAMILY MEDICINE CLINIC | Facility: CLINIC | Age: 40
End: 2020-09-30

## 2020-09-30 VITALS
SYSTOLIC BLOOD PRESSURE: 120 MMHG | TEMPERATURE: 98 F | BODY MASS INDEX: 32.43 KG/M2 | HEIGHT: 63 IN | OXYGEN SATURATION: 98 % | RESPIRATION RATE: 18 BRPM | DIASTOLIC BLOOD PRESSURE: 80 MMHG | HEART RATE: 75 BPM | WEIGHT: 183 LBS

## 2020-09-30 DIAGNOSIS — E78.6 LOW HDL (UNDER 40): ICD-10-CM

## 2020-09-30 DIAGNOSIS — R73.03 PREDIABETES: Primary | ICD-10-CM

## 2020-09-30 DIAGNOSIS — E78.2 MIXED HYPERLIPIDEMIA: ICD-10-CM

## 2020-09-30 DIAGNOSIS — Z23 NEED FOR INFLUENZA VACCINATION: ICD-10-CM

## 2020-09-30 PROCEDURE — 90471 IMMUNIZATION ADMIN: CPT

## 2020-09-30 PROCEDURE — 99213 OFFICE O/P EST LOW 20 MIN: CPT | Performed by: FAMILY MEDICINE

## 2020-09-30 PROCEDURE — 90686 IIV4 VACC NO PRSV 0.5 ML IM: CPT

## 2020-09-30 NOTE — PATIENT INSTRUCTIONS
Lifestyle Medicine Tip Sheet- [place]ian    1  Coma alimentos predominantemente menos procesados, hamzah comida rápida, cenas de televisión y tocino  2  Coma cerca de la naturaleza (mercados de agricultores, productos frescos o congelados)    3  Coma pat dieta predominantemente basada en plantas  a  Verdes frondosos oscuros miguel   b  Frutas y vegetales  c   Granos integrales: peter integral, apenas, bayas de peter, quinua, hilaria cortada en yareli, arroz integral, pasta integral   d  Legumbres: frijoles, frijoles pintos, frijoles blancos, frijoles negros, garbanzos (garbanzos), frijoles lima (maduros, secos), arvejas, lentejas y edamame (frijoles de soya verdes)      4  Al menos la mitad del plato debe contener frutas o verduras  5  El líquido debe ser predominantemente agua (limite el refresco y [place])    6  Mary el tamaño de la porción  7  ¿Qué alimentos tee evitar o limitar? - Grasas: Específicamente saturadas y grasas trans  Se encuentran en margarinas, muchas comidas rápidas y algunos productos horneados comprados en la sita  Las grasas saturadas y grasas trans pueden elevar yusuf nivel de colesterol y yusuf probabilidad de contraer enfermedades cardíacas  - Cuando cocine, es mejor no usar aceites, abhishek si es necesario, trate de limitar la cantidad de aceite utilizado, ya que el aceite contiene muchas calorías por volumen y es muy poco saludable cuando se calienta ethan la cocción   - Azúcar: limite o evite el azúcar, los dulces y los granos refinados  Los granos refinados se encuentran en el pan carrero, el arroz carrero, la mayoría de las formas de pasta y la mayoría de los alimentos "aperitivos" envasados  - Intente no cocinar con antonio y evite agregar antonio adicional a mimi comidas  - Sulma Ferguson: los estudios grant demostrado que comer mucha lakhwinder Burrows riesgo de ciertos problemas de [place], hamzah enfermedades cardíacas y cáncer  8  7-9 horas de sueño    9   Ejercicio diario mínimo de 30 minutos (caminando alrededor de la ángel)    10  Socialización (amigos y familiares)    - Explora tu vecindario  Ve al parque, pasa tiempo en la biblioteca  Si está interesado, puede leer más sobre las opciones de alimentos saludables en los siguientes sitios web:  a  NutritionBench  org  b  Home cooking recipes: https://www SiteMinder/  c  http://sacha info/  d  Familydoctor  org

## 2020-09-30 NOTE — ASSESSMENT & PLAN NOTE
BMI Counseling: There is no height or weight on file to calculate BMI  The BMI is above normal  Nutrition recommendations include reducing portion sizes, decreasing overall calorie intake, 3-5 servings of fruits/vegetables daily, reducing fast food intake, consuming healthier snacks and decreasing soda and/or juice intake  Exercise recommendations include exercising 3-5 times per week
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
## 2020-10-01 NOTE — ASSESSMENT & PLAN NOTE
Patient is prediabetic, with obesity, hyperlipidemia and low HDL-->meets metabolic syndrome criteria   Will monitor weight loss and screening labs   Discussed at length with patient diet and exercise, she is motivated to follow my recommendations

## 2020-10-07 ENCOUNTER — ANNUAL EXAM (OUTPATIENT)
Dept: FAMILY MEDICINE CLINIC | Facility: CLINIC | Age: 40
End: 2020-10-07

## 2020-10-07 VITALS
HEART RATE: 78 BPM | DIASTOLIC BLOOD PRESSURE: 80 MMHG | WEIGHT: 182 LBS | HEIGHT: 63 IN | SYSTOLIC BLOOD PRESSURE: 120 MMHG | BODY MASS INDEX: 32.25 KG/M2 | OXYGEN SATURATION: 98 % | RESPIRATION RATE: 18 BRPM | TEMPERATURE: 98 F

## 2020-10-07 DIAGNOSIS — A59.9 TRICHOMONAS VAGINALIS INFECTION: Primary | ICD-10-CM

## 2020-10-07 PROCEDURE — 1036F TOBACCO NON-USER: CPT | Performed by: FAMILY MEDICINE

## 2020-10-07 PROCEDURE — 87660 TRICHOMONAS VAGIN DIR PROBE: CPT | Performed by: FAMILY MEDICINE

## 2020-10-07 PROCEDURE — 99213 OFFICE O/P EST LOW 20 MIN: CPT | Performed by: FAMILY MEDICINE

## 2020-10-07 PROCEDURE — 87510 GARDNER VAG DNA DIR PROBE: CPT | Performed by: FAMILY MEDICINE

## 2020-10-07 PROCEDURE — 87480 CANDIDA DNA DIR PROBE: CPT | Performed by: FAMILY MEDICINE

## 2020-10-07 PROCEDURE — 3008F BODY MASS INDEX DOCD: CPT | Performed by: FAMILY MEDICINE

## 2020-10-08 LAB
CANDIDA RRNA VAG QL PROBE: NEGATIVE
G VAGINALIS RRNA GENITAL QL PROBE: NEGATIVE
T VAGINALIS RRNA GENITAL QL PROBE: NEGATIVE

## 2021-04-01 ENCOUNTER — LAB (OUTPATIENT)
Dept: LAB | Facility: CLINIC | Age: 41
End: 2021-04-01
Payer: COMMERCIAL

## 2021-04-01 DIAGNOSIS — R73.03 PREDIABETES: ICD-10-CM

## 2021-04-01 DIAGNOSIS — E78.2 MIXED HYPERLIPIDEMIA: ICD-10-CM

## 2021-04-01 LAB
CHOLEST SERPL-MCNC: 205 MG/DL (ref 50–200)
EST. AVERAGE GLUCOSE BLD GHB EST-MCNC: 111 MG/DL
HBA1C MFR BLD: 5.5 %
HDLC SERPL-MCNC: 32 MG/DL
LDLC SERPL CALC-MCNC: 102 MG/DL (ref 0–100)
TRIGL SERPL-MCNC: 357 MG/DL

## 2021-04-01 PROCEDURE — 80061 LIPID PANEL: CPT

## 2021-04-01 PROCEDURE — 83036 HEMOGLOBIN GLYCOSYLATED A1C: CPT

## 2021-04-01 PROCEDURE — 36415 COLL VENOUS BLD VENIPUNCTURE: CPT

## 2021-04-02 ENCOUNTER — OFFICE VISIT (OUTPATIENT)
Dept: FAMILY MEDICINE CLINIC | Facility: CLINIC | Age: 41
End: 2021-04-02

## 2021-04-02 ENCOUNTER — LAB (OUTPATIENT)
Dept: LAB | Facility: CLINIC | Age: 41
End: 2021-04-02
Payer: COMMERCIAL

## 2021-04-02 VITALS
WEIGHT: 178 LBS | RESPIRATION RATE: 18 BRPM | DIASTOLIC BLOOD PRESSURE: 60 MMHG | BODY MASS INDEX: 31.54 KG/M2 | TEMPERATURE: 97.5 F | SYSTOLIC BLOOD PRESSURE: 100 MMHG | HEART RATE: 76 BPM | OXYGEN SATURATION: 99 % | HEIGHT: 63 IN

## 2021-04-02 DIAGNOSIS — R73.03 PREDIABETES: Primary | ICD-10-CM

## 2021-04-02 DIAGNOSIS — E66.09 CLASS 1 OBESITY DUE TO EXCESS CALORIES WITHOUT SERIOUS COMORBIDITY WITH BODY MASS INDEX (BMI) OF 31.0 TO 31.9 IN ADULT: ICD-10-CM

## 2021-04-02 DIAGNOSIS — M21.611 BUNION OF RIGHT FOOT: ICD-10-CM

## 2021-04-02 DIAGNOSIS — Z11.4 ENCOUNTER FOR SCREENING FOR HIV: ICD-10-CM

## 2021-04-02 DIAGNOSIS — Z12.31 VISIT FOR SCREENING MAMMOGRAM: ICD-10-CM

## 2021-04-02 DIAGNOSIS — E78.2 MIXED HYPERLIPIDEMIA: ICD-10-CM

## 2021-04-02 PROBLEM — E66.811 CLASS 1 OBESITY DUE TO EXCESS CALORIES WITHOUT SERIOUS COMORBIDITY WITH BODY MASS INDEX (BMI) OF 31.0 TO 31.9 IN ADULT: Status: ACTIVE | Noted: 2020-03-17

## 2021-04-02 PROCEDURE — 99213 OFFICE O/P EST LOW 20 MIN: CPT | Performed by: FAMILY MEDICINE

## 2021-04-02 PROCEDURE — 36415 COLL VENOUS BLD VENIPUNCTURE: CPT

## 2021-04-02 PROCEDURE — 87389 HIV-1 AG W/HIV-1&-2 AB AG IA: CPT

## 2021-04-02 NOTE — ASSESSMENT & PLAN NOTE
ASCVD risk 2%   Continue with low fat diet and exercise   Currently lipid panel shows improvement (, , , HDL 32   Weight loss counseling discussed

## 2021-04-02 NOTE — PROGRESS NOTES
Assessment/Plan:    Prediabetes  Hba1c trending down from 5 7-->5  4  Currently no longer prediabetic   Advised to continue with low carb diet and exercise  Weight loss counseling discussed     Class 1 obesity due to excess calories without serious comorbidity with body mass index (BMI) of 31 0 to 31 9 in adult  BMI Counseling: Body mass index is 31 53 kg/m²  The BMI is above normal  Nutrition recommendations include reducing portion sizes, decreasing overall calorie intake, 3-5 servings of fruits/vegetables daily, reducing fast food intake, consuming healthier snacks, decreasing soda and/or juice intake, moderation in carbohydrate intake, increasing intake of lean protein and reducing intake of saturated fat and trans fat  Exercise recommendations include exercising 3-5 times per week  Mixed hyperlipidemia  ASCVD risk 2%   Continue with low fat diet and exercise   Currently lipid panel shows improvement (, , , HDL 32   Weight loss counseling discussed     Bunion of right foot  Discussed with patient to choose comfortable shoes with no pointy toes and with plenty of room  Counseled patient to purchase shoe insoles and special pads that can cushion the area near the bunion  Use sparingly NSAIDs and Tylenol as needed to help with swelling and pain   F/u as needed or sooner if pain worsens          Diagnoses and all orders for this visit:    Prediabetes    Mixed hyperlipidemia  -     Lipid Panel with Direct LDL reflex; Future    Class 1 obesity due to excess calories without serious comorbidity with body mass index (BMI) of 31 0 to 31 9 in adult    Visit for screening mammogram  -     Mammo screening bilateral w cad; Future    Encounter for screening for HIV  -     HIV 1/2 Antigen/Antibody (4th Generation) w Reflex [facility]; Future    Bunion of right foot          Subjective:      Patient ID: Derek Little is a 39 y o  female  HPI     Patient presents today to review labs    States she noted R painful, bulging bony bump at the MTP joint  States sometimes it's more painful than other  Denies increased erythema, edema  No other concerns at this time  Preventative care  Had Tdap vaccine 3 years ago  Mammogram: will order test, pt agreeable  HIV: will place order  The following portions of the patient's history were reviewed and updated as appropriate: allergies, current medications, past family history, past medical history, past social history, past surgical history and problem list     Review of Systems   Constitutional: Negative for chills, fatigue and fever  HENT: Negative for sore throat and trouble swallowing  Eyes: Negative for visual disturbance  Respiratory: Negative for cough and shortness of breath  Cardiovascular: Negative for chest pain and leg swelling  Gastrointestinal: Negative for abdominal pain, blood in stool, constipation, diarrhea, nausea and vomiting  Musculoskeletal: Negative for gait problem  Skin: Negative for rash  Slightly tender and bulging bump of R foot    Neurological: Negative for dizziness and headaches  Psychiatric/Behavioral: Negative for agitation  Objective:      /60 (BP Location: Left arm, Patient Position: Sitting, Cuff Size: Adult)   Pulse 76   Temp 97 5 °F (36 4 °C) (Temporal)   Resp 18   Ht 5' 3" (1 6 m)   Wt 80 7 kg (178 lb)   LMP 03/15/2021   SpO2 99%   BMI 31 53 kg/m²          Physical Exam  Vitals signs and nursing note reviewed  Constitutional:       General: She is not in acute distress  Appearance: Normal appearance  She is well-developed  She is obese  She is not ill-appearing, toxic-appearing or diaphoretic  HENT:      Head: Normocephalic and atraumatic  Nose: Nose normal    Eyes:      Extraocular Movements: Extraocular movements intact  Conjunctiva/sclera: Conjunctivae normal       Pupils: Pupils are equal, round, and reactive to light     Neck:      Musculoskeletal: Normal range of motion and neck supple  Cardiovascular:      Rate and Rhythm: Normal rate and regular rhythm  Heart sounds: Normal heart sounds  Pulmonary:      Effort: Pulmonary effort is normal  No respiratory distress  Breath sounds: Normal breath sounds  Abdominal:      General: Bowel sounds are normal       Palpations: Abdomen is soft  Tenderness: There is no abdominal tenderness  Musculoskeletal: Normal range of motion  General: No tenderness  Right lower leg: No edema  Left lower leg: No edema  Skin:     General: Skin is warm  Findings: No rash  Comments: Localized, bony, slightly tender, non-erythematous, swelling at the base of the R big toe consistent with bunion formation    Neurological:      Mental Status: She is alert and oriented to person, place, and time  Psychiatric:         Mood and Affect: Mood normal          Behavior: Behavior normal          Thought Content:  Thought content normal          Judgment: Judgment normal

## 2021-04-02 NOTE — ASSESSMENT & PLAN NOTE
BMI Counseling: Body mass index is 31 53 kg/m²  The BMI is above normal  Nutrition recommendations include reducing portion sizes, decreasing overall calorie intake, 3-5 servings of fruits/vegetables daily, reducing fast food intake, consuming healthier snacks, decreasing soda and/or juice intake, moderation in carbohydrate intake, increasing intake of lean protein and reducing intake of saturated fat and trans fat  Exercise recommendations include exercising 3-5 times per week

## 2021-04-02 NOTE — ASSESSMENT & PLAN NOTE
Hba1c trending down from 5 7-->5  4   Currently no longer prediabetic   Advised to continue with low carb diet and exercise  Weight loss counseling discussed

## 2021-04-02 NOTE — PATIENT INSTRUCTIONS
Lifestyle Medicine Tip Sheet- Malaysian    1  Coma alimentos predominantemente menos procesados, hamzah comida rápida, cenas de televisión y tocino  2  Coma cerca de la naturaleza (mercados de agricultores, productos frescos o congelados)    3  Coma pat dieta predominantemente basada en plantas  a  Verdes frondosos oscuros miguel   b  Frutas y vegetales  c   Granos integrales: peter integral, apenas, bayas de peter, quinua, hilaria cortada en yareli, arroz integral, pasta integral   d  Legumbres: frijoles, frijoles pintos, frijoles blancos, frijoles negros, garbanzos (garbanzos), frijoles lima (maduros, secos), arvejas, lentejas y edamame (frijoles de soya verdes)      4  Al menos la mitad del plato debe contener frutas o verduras  5  El líquido debe ser predominantemente agua (limite el refresco y [place])    6  Mary el tamaño de la porción  7  ¿Qué alimentos tee evitar o limitar? - Grasas: Específicamente saturadas y grasas trans  Se encuentran en margarinas, muchas comidas rápidas y algunos productos horneados comprados en la sita  Las grasas saturadas y grasas trans pueden elevar yusuf nivel de colesterol y yusuf probabilidad de contraer enfermedades cardíacas  - Cuando cocine, es mejor no usar aceites, abhishek si es necesario, trate de limitar la cantidad de aceite utilizado, ya que el aceite contiene muchas calorías por volumen y es muy poco saludable cuando se calienta ethan la cocción   - Azúcar: limite o evite el azúcar, los dulces y los granos refinados  Los granos refinados se encuentran en el pan carrero, el arroz carrero, la mayoría de las formas de pasta y la mayoría de los alimentos "aperitivos" envasados  - Intente no cocinar con antonio y evite agregar antonio adicional a mimi comidas  - [place]: los estudios grant demostrado que comer mucha lakhwinder Burrows riesgo de ciertos problemas de [place], hamzah enfermedades cardíacas y cáncer  8  7-9 horas de sueño    9   Ejercicio diario mínimo de 30 minutos (caminando alrededor de la ángel)    10  Socialización (amigos y familiares)    - Explora tu vecindario  Ve al parque, pasa tiempo en la biblioteca  Si está interesado, puede leer más sobre las opciones de alimentos saludables en los siguientes sitios web:  a  NutritionBaoku  org  b  Home cooking recipes: https://www Pipewise/  c  http://sacha info/  d  Familydoctor  org

## 2021-04-04 PROBLEM — M21.611 BUNION OF RIGHT FOOT: Status: ACTIVE | Noted: 2021-04-04

## 2021-04-04 NOTE — ASSESSMENT & PLAN NOTE
Discussed with patient to choose comfortable shoes with no pointy toes and with plenty of room  Counseled patient to purchase shoe insoles and special pads that can cushion the area near the bunion    Use sparingly NSAIDs and Tylenol as needed to help with swelling and pain   F/u as needed or sooner if pain worsens

## 2021-04-05 LAB — HIV 1+2 AB+HIV1 P24 AG SERPL QL IA: NORMAL

## 2021-04-06 ENCOUNTER — IMMUNIZATIONS (OUTPATIENT)
Dept: FAMILY MEDICINE CLINIC | Facility: HOSPITAL | Age: 41
End: 2021-04-06

## 2021-04-06 DIAGNOSIS — Z23 ENCOUNTER FOR IMMUNIZATION: Primary | ICD-10-CM

## 2021-04-06 PROCEDURE — 91301 SARS-COV-2 / COVID-19 MRNA VACCINE (MODERNA) 100 MCG: CPT

## 2021-04-06 PROCEDURE — 0011A SARS-COV-2 / COVID-19 MRNA VACCINE (MODERNA) 100 MCG: CPT

## 2021-05-07 ENCOUNTER — IMMUNIZATIONS (OUTPATIENT)
Dept: FAMILY MEDICINE CLINIC | Facility: HOSPITAL | Age: 41
End: 2021-05-07

## 2021-05-07 DIAGNOSIS — Z23 ENCOUNTER FOR IMMUNIZATION: Primary | ICD-10-CM

## 2021-05-07 PROCEDURE — 91301 SARS-COV-2 / COVID-19 MRNA VACCINE (MODERNA) 100 MCG: CPT

## 2021-05-07 PROCEDURE — 0012A SARS-COV-2 / COVID-19 MRNA VACCINE (MODERNA) 100 MCG: CPT

## 2021-10-15 ENCOUNTER — APPOINTMENT (OUTPATIENT)
Dept: LAB | Facility: CLINIC | Age: 41
End: 2021-10-15
Payer: COMMERCIAL

## 2021-10-15 LAB
CHOLEST SERPL-MCNC: 212 MG/DL (ref 50–200)
HDLC SERPL-MCNC: 32 MG/DL
LDLC SERPL CALC-MCNC: 114 MG/DL (ref 0–100)
TRIGL SERPL-MCNC: 328 MG/DL

## 2021-10-15 PROCEDURE — 80061 LIPID PANEL: CPT

## 2021-10-15 PROCEDURE — 36415 COLL VENOUS BLD VENIPUNCTURE: CPT

## 2021-10-17 PROBLEM — A59.9 TRICHOMONAS VAGINALIS INFECTION: Status: RESOLVED | Noted: 2020-10-07 | Resolved: 2021-10-17

## 2021-10-17 PROBLEM — Z00.00 ENCOUNTER FOR SCREENING AND PREVENTATIVE CARE: Status: RESOLVED | Noted: 2020-03-16 | Resolved: 2021-10-17

## 2021-10-17 PROBLEM — Z00.00 ENCOUNTER FOR WELLNESS EXAMINATION IN ADULT: Status: RESOLVED | Noted: 2020-07-29 | Resolved: 2021-10-17

## 2021-10-18 ENCOUNTER — OFFICE VISIT (OUTPATIENT)
Dept: FAMILY MEDICINE CLINIC | Facility: CLINIC | Age: 41
End: 2021-10-18

## 2021-10-18 VITALS
BODY MASS INDEX: 31.54 KG/M2 | TEMPERATURE: 98 F | SYSTOLIC BLOOD PRESSURE: 100 MMHG | RESPIRATION RATE: 16 BRPM | HEART RATE: 90 BPM | HEIGHT: 63 IN | DIASTOLIC BLOOD PRESSURE: 60 MMHG | WEIGHT: 178 LBS | OXYGEN SATURATION: 98 %

## 2021-10-18 DIAGNOSIS — L85.8 KERATOSIS PILARIS: ICD-10-CM

## 2021-10-18 DIAGNOSIS — Z23 IMMUNIZATION DUE: ICD-10-CM

## 2021-10-18 DIAGNOSIS — M21.611 BUNION OF RIGHT FOOT: ICD-10-CM

## 2021-10-18 DIAGNOSIS — E78.2 MIXED HYPERLIPIDEMIA: Primary | ICD-10-CM

## 2021-10-18 DIAGNOSIS — E66.09 CLASS 1 OBESITY DUE TO EXCESS CALORIES WITHOUT SERIOUS COMORBIDITY WITH BODY MASS INDEX (BMI) OF 31.0 TO 31.9 IN ADULT: ICD-10-CM

## 2021-10-18 PROCEDURE — 90686 IIV4 VACC NO PRSV 0.5 ML IM: CPT | Performed by: FAMILY MEDICINE

## 2021-10-18 PROCEDURE — 90471 IMMUNIZATION ADMIN: CPT | Performed by: FAMILY MEDICINE

## 2021-10-18 PROCEDURE — 99214 OFFICE O/P EST MOD 30 MIN: CPT | Performed by: FAMILY MEDICINE

## 2021-10-18 RX ORDER — AMMONIUM LACTATE 12 G/100G
CREAM TOPICAL DAILY
Qty: 385 G | Refills: 0 | Status: SHIPPED | OUTPATIENT
Start: 2021-10-18

## 2021-10-18 RX ORDER — AMMONIUM LACTATE 12 G/100G
CREAM TOPICAL AS NEEDED
Qty: 385 G | Refills: 0 | Status: SHIPPED | OUTPATIENT
Start: 2021-10-18 | End: 2021-10-18

## 2021-11-08 ENCOUNTER — OFFICE VISIT (OUTPATIENT)
Dept: PODIATRY | Facility: CLINIC | Age: 41
End: 2021-11-08
Payer: COMMERCIAL

## 2021-11-08 VITALS
SYSTOLIC BLOOD PRESSURE: 108 MMHG | WEIGHT: 177 LBS | HEIGHT: 64 IN | DIASTOLIC BLOOD PRESSURE: 76 MMHG | BODY MASS INDEX: 30.22 KG/M2

## 2021-11-08 DIAGNOSIS — M21.611 BUNION OF RIGHT FOOT: Primary | ICD-10-CM

## 2021-11-08 PROCEDURE — 99244 OFF/OP CNSLTJ NEW/EST MOD 40: CPT | Performed by: PODIATRIST

## 2021-11-08 RX ORDER — CEFAZOLIN SODIUM 2 G/50ML
2000 SOLUTION INTRAVENOUS ONCE
Status: CANCELLED | OUTPATIENT
Start: 2022-01-07

## 2022-01-04 ENCOUNTER — TELEPHONE (OUTPATIENT)
Dept: PODIATRY | Facility: CLINIC | Age: 42
End: 2022-01-04

## 2022-01-04 NOTE — TELEPHONE ENCOUNTER
Annette Reddy from pre encounter called for an authorization for surgery  I don't know who this goes to  Surgery coordinator? Please reach out to [address]    Thank you!!!!

## 2022-01-06 ENCOUNTER — TELEPHONE (OUTPATIENT)
Dept: PODIATRY | Facility: CLINIC | Age: 42
End: 2022-01-06

## 2022-01-06 NOTE — TELEPHONE ENCOUNTER
Fabiana Ji at [facility] spoke with Toan Fitzpatrick about tomorrow's surgery at [address] told Fabiana Ji that she canceled the surgery  I did not see anything in her chart  On the appointment screen I saw that she canceled the surgery and the post op appointment through My Chart

## 2022-01-19 ENCOUNTER — APPOINTMENT (OUTPATIENT)
Dept: LAB | Facility: CLINIC | Age: 42
End: 2022-01-19
Payer: COMMERCIAL

## 2022-01-19 DIAGNOSIS — E78.2 MIXED HYPERLIPIDEMIA: ICD-10-CM

## 2022-01-19 LAB
CHOLEST SERPL-MCNC: 221 MG/DL
HDLC SERPL-MCNC: 34 MG/DL
LDLC SERPL CALC-MCNC: 141 MG/DL (ref 0–100)
TRIGL SERPL-MCNC: 232 MG/DL

## 2022-01-19 PROCEDURE — 80061 LIPID PANEL: CPT

## 2022-01-19 PROCEDURE — 36415 COLL VENOUS BLD VENIPUNCTURE: CPT

## 2022-02-13 NOTE — PROGRESS NOTES
[address] [place] [address]  Annual Well Adult Visit      Assessment/Plan     Encounter for Annual Physical Exam  Immunizations and labs reviewed with patient  HIV and Hep C Ab per USPSTF guidelines  Lifestyle changes with healthy, low fat diet and exercise strongly encouraged     Mixed Hyperlipidemia  Reviewed most recent lipid panel (01/19/22): TC 2 21, , , HDL 34  ASCVD risk 0 9%  No indication to start statin at this time  Continue with low fat diet and exercise  Weight loss counseling  Referral to weight management placed per patient's preference     Class 1 Obesity  BMI Counseling: Body mass index is 30 38 kg/m²  The BMI is above normal  Nutrition recommendations include reducing portion sizes, decreasing overall calorie intake, 3-5 servings of fruits/vegetables daily, reducing fast food intake, consuming healthier snacks, decreasing soda and/or juice intake, moderation in carbohydrate intake, increasing intake of lean protein, reducing intake of saturated fat and trans fat and reducing intake of cholesterol  Exercise recommendations include exercising 3-5 times per week  Patient also referred to weight management for further management     1  Encounter for female physical exam   2  Patient Counseling:   · Nutrition: Stressed importance of a well balanced diet, moderation of sodium/saturated fat, caloric balance and sufficient intake of fiber  · Exercise: Stressed the importance of regular exercise with a goal of 150 minutes per week  · Dental Health: Discussed daily flossing and brushing and regular dental visits   · Alcohol Use:  Recommended moderation of alcohol intake    · Immunizations reviewed  · Discussed benefits of screening  · Discussed the patient's BMI with her  The BMI is above average; BMI management plan is completed  3  Cancer Screening  4  Labs: reviewed with patient   5   She has been feeling sleepy and difficulty waking up in the morning (sleeps 9 hrs/ night, uninterrupted)  Denies feeling down, depressed  She does have recent stressors with her son who was recently sick  Will check labs to ensure she has no electrolyte, vitamin deficiencies  Adequate hydration encouraged  6  Follow up in one year  Subjective     Mason Reina is a 39 y o   female and is here for routine health maintenance  The patient reports no problems  History of Present Illness     HPI    Well Adult Physical   Patient here for a comprehensive physical exam       Diet and Physical Activity  Diet: well balanced diet  Weight concerns: Patient has class 1 obesity (BMI 30-34  9)  Exercise: infrequently      Depression Screen  PHQ-2/9 Depression Screening    Little interest or pleasure in doing things: 0 - not at all  Feeling down, depressed, or hopeless: 0 - not at all  PHQ-2 Score: 0  PHQ-2 Interpretation: Negative depression screen          General Health  Hearing: Normal:  bilateral  Vision: no vision problems, most recent eye exam <1 year and wears glasses  Dental: regular dental visits and brushes teeth three times daily     History:  LMP: 02/10/22  Menopause: N/A   : G2  Para: P2    Cancer Screening  Colonoscopy: at 39 yoa  Mammogram: he's due to schedule     Pap (20): normal with HPV neg  Abnormal pap? no  Smoker: No  Annual screening with low-dose helical computed tomography (CT) for patients age 48 to 76 years with history of smoking at least 30 pack-years and, if a former smoker, had quit within the previous 15 years      The following portions of the patient's history were reviewed and updated as appropriate: allergies, current medications, past family history, past medical history, past social history, past surgical history and problem list     Review of Systems     Review of Systems   Constitutional: Negative for chills, fatigue and fever  HENT: Negative for trouble swallowing  Eyes: Negative for visual disturbance     Respiratory: Negative for cough and shortness of breath  Cardiovascular: Negative for chest pain and leg swelling  Gastrointestinal: Negative for abdominal distention, abdominal pain, blood in stool, constipation, diarrhea, nausea and vomiting  Genitourinary: Negative for dysuria and hematuria  Musculoskeletal: Negative for gait problem  Skin: Negative for rash  Neurological: Negative for dizziness and headaches  Psychiatric/Behavioral: Negative for agitation and sleep disturbance  Past Medical History     History reviewed  No pertinent past medical history  Past Surgical History     Past Surgical History:   Procedure Laterality Date    TUBAL LIGATION         Social History     Social History     Socioeconomic History    Marital status: Single     Spouse name: None    Number of children: 2    Years of education: None    Highest education level: None   Occupational History    None   Tobacco Use    Smoking status: Never Smoker    Smokeless tobacco: Never Used   Vaping Use    Vaping Use: Never used   Substance and Sexual Activity    Alcohol use: Not Currently     Comment: last used 2006    Drug use: Never    Sexual activity: Yes     Partners: Male     Birth control/protection: Female Sterilization   Other Topics Concern    None   Social History Narrative    None     Social Determinants of Health     Financial Resource Strain: Low Risk     Difficulty of Paying Living Expenses: Not hard at all   Food Insecurity: No Food Insecurity    Worried About Running Out of Food in the Last Year: Never true    Sherry of Food in the Last Year: Never true   Transportation Needs: Unmet Transportation Needs    Lack of Transportation (Medical): Yes    Lack of Transportation (Non-Medical): Yes   Physical Activity: Not on file   Stress: Not on file   Social Connections: Not on file   Intimate Partner Violence: Not on file   Housing Stability: Not on file       Family History     History reviewed   No pertinent family history  Current Medications       Current Outpatient Medications:     ammonium lactate (LAC-HYDRIN) 12 % cream, Apply topically daily, Disp: 385 g, Rfl: 0    hydrocortisone 2 5 % cream, Apply topically 4 (four) times a day as needed for rash (Patient not taking: Reported on 11/8/2021 ), Disp: 30 g, Rfl: 0    loratadine (CLARITIN REDITABS) 10 MG dissolvable tablet, Take 1 tablet (10 mg total) by mouth daily (Patient not taking: Reported on 4/2/2021), Disp: 90 tablet, Rfl: 1     Allergies     No Known Allergies    Objective     BP 90/64 (BP Location: Left arm, Patient Position: Sitting, Cuff Size: Adult)   Pulse 72   Temp 98 3 °F (36 8 °C) (Temporal)   Resp 20   Wt 80 3 kg (177 lb)   LMP 02/10/2022   SpO2 98%   BMI 30 38 kg/m²      Physical Exam  Vitals and nursing note reviewed  Constitutional:       General: She is not in acute distress  Appearance: Normal appearance  She is well-developed  She is obese  She is not ill-appearing, toxic-appearing or diaphoretic  HENT:      Head: Normocephalic and atraumatic  Nose: Nose normal    Eyes:      Extraocular Movements: Extraocular movements intact  Cardiovascular:      Rate and Rhythm: Normal rate and regular rhythm  Heart sounds: Normal heart sounds  Pulmonary:      Effort: Pulmonary effort is normal  No respiratory distress  Breath sounds: Normal breath sounds  Abdominal:      General: Bowel sounds are normal       Palpations: Abdomen is soft  Tenderness: There is no abdominal tenderness  Musculoskeletal:         General: Normal range of motion  Cervical back: Normal range of motion and neck supple  Right lower leg: No edema  Left lower leg: No edema  Skin:     General: Skin is warm  Findings: No rash  Neurological:      Mental Status: She is alert and oriented to person, place, and time     Psychiatric:         Mood and Affect: Mood normal          Behavior: Behavior normal          Thought Content: Thought content normal          Judgment: Judgment normal            No exam data present    Health Maintenance     Health Maintenance   Topic Date Due    Hepatitis C Screening  Never done    DTaP,Tdap,and Td Vaccines (1 - Tdap) Never done    Breast Cancer Screening: Mammogram  Never done    Depression Screening  02/14/2023    BMI: Followup Plan  02/14/2023    BMI: Adult  02/14/2023    Annual Physical  02/14/2023    Cervical Cancer Screening  07/29/2023    HIV Screening  Completed    Influenza Vaccine  Completed    COVID-19 Vaccine  Completed    Pneumococcal Vaccine: Pediatrics (0 to 5 Years) and At-Risk Patients (6 to 59 Years)  Aged Out    HIB Vaccine  Aged Out    Hepatitis B Vaccine  Aged Out    IPV Vaccine  Aged Out    Hepatitis A Vaccine  Aged Out    Meningococcal ACWY Vaccine  Aged Out    HPV Vaccine  Aged [employer] History   Administered Date(s) Administered    COVID-19 MODERNA VACC 0 5 ML IM 04/06/2021, 05/07/2021, 12/01/2021    Influenza, injectable, quadrivalent, preservative free 0 5 mL 03/17/2020, 09/30/2020, 10/18/2021         Wilfred Ray MD

## 2022-02-14 ENCOUNTER — OFFICE VISIT (OUTPATIENT)
Dept: FAMILY MEDICINE CLINIC | Facility: CLINIC | Age: 42
End: 2022-02-14

## 2022-02-14 VITALS
BODY MASS INDEX: 30.38 KG/M2 | WEIGHT: 177 LBS | TEMPERATURE: 98.3 F | HEART RATE: 72 BPM | SYSTOLIC BLOOD PRESSURE: 90 MMHG | RESPIRATION RATE: 20 BRPM | DIASTOLIC BLOOD PRESSURE: 64 MMHG | OXYGEN SATURATION: 98 %

## 2022-02-14 DIAGNOSIS — Z13.0 SCREENING FOR IRON DEFICIENCY ANEMIA: ICD-10-CM

## 2022-02-14 DIAGNOSIS — E56.9 VITAMIN DEFICIENCY: ICD-10-CM

## 2022-02-14 DIAGNOSIS — Z00.00 ENCOUNTER FOR ANNUAL PHYSICAL EXAM: Primary | ICD-10-CM

## 2022-02-14 DIAGNOSIS — Z11.59 ENCOUNTER FOR HEPATITIS C SCREENING TEST FOR LOW RISK PATIENT: ICD-10-CM

## 2022-02-14 DIAGNOSIS — Z23 NEED FOR TDAP VACCINATION: ICD-10-CM

## 2022-02-14 DIAGNOSIS — E66.09 CLASS 1 OBESITY DUE TO EXCESS CALORIES WITHOUT SERIOUS COMORBIDITY WITH BODY MASS INDEX (BMI) OF 31.0 TO 31.9 IN ADULT: ICD-10-CM

## 2022-02-14 PROCEDURE — 99396 PREV VISIT EST AGE 40-64: CPT | Performed by: FAMILY MEDICINE

## 2022-02-14 PROCEDURE — 90471 IMMUNIZATION ADMIN: CPT | Performed by: FAMILY MEDICINE

## 2022-02-14 PROCEDURE — 90715 TDAP VACCINE 7 YRS/> IM: CPT | Performed by: FAMILY MEDICINE

## 2022-02-14 NOTE — PATIENT INSTRUCTIONS
Dieta baja en grasa   LO QUE NECESITA SABER:   Pat dieta baja en grasa es un plan alimenticio con un bajo contenido de grasa en general, de grasa no saludable y de colesterol  Es posible que deba seguir pat dieta baja en grasas si tiene dificultad para digerir o absorber las grasas  Puede también que deba seguir zara tipo de dieta si tiene colesterol alto  [place] que consume para bajar yusuf nivel de colesterol  La fibra soluble es un tipo de fibra que ayuda a bajar el nivel de [place]  INSTRUCCIONES SOBRE [facility]ITAL[facility]:   Distintos tipos de grasa que contienen los alimentos:  · Limite las grasas no saludables  Donnella Darner con un alto contenido de colesterol, grasas saturadas y grasas trans puede hacer que yusuf colesterol suba a un nivel no saludable  El riesgo de tener pat enfermedad cardíaca aumenta cuando el nivel de colesterol no se encuentra dentro de un margen saludable  ? Colesterol: Limite el consumo de colesterol a menos de 200 mg al día  El colesterol se encuentra en las martha, los huevos y productos lácteos  ? Grasas saturadas: Limite el consumo de grasas saturadas a menos del 7% del total de mimi calorías diarias  Pregunte a yusuf dietista cuántas calorías necesita por día  Las grasas saturadas se encuentran en la [facility] york, el queso, el helado, la [place] entera y el aceite de tracy  Las martha, hamzah la carne de res, de cerdo, la piel de juliocesar y las martha procesadas, también contienen grasas saturadas  Las martha procesadas [employer], los embutidos y la mortadela  ? Grasas trans: Evite el consumo de grasas trans siempre que pueda  Las grasas trans se usan en los alimentos fritos y de [place]  Pese a que algunos alimentos dicen no tener grasas trans en el paquete, pueden contener hasta 0 5 gramos de grasas trans por porción  · Incluya grasas saludables   Sustituya los [facility] tienen un alto contenido de grasas saturadas y grasas trans por alimentos que tienen un alto contenido de grasas saludables  [place] puede ayudar a [employer] de [facility]  ? Grasas monoinsaturadas: Se encuentran en los [address], las nueces y los [address] [facility] , hamzah el aceite de [place], canola y [place]  ? Grasas poliinsaturadas: Se pueden encontrar en los [address] [facility] , hamzah el aceite de soya o de [place]  Las grasas Omega 3 pueden contribuir a disminuir el riesgo de padecer enfermedades cardíacas  Las [place] se [employer] pescados, hamzah el salmón, el arenque, la trucha y [place] breeze  Las grasas Omega 3 también se encuentran en los alimentos que provienen de las plantas, hamzah las nueces, la linaza, la soya y el aceite de canola o colza  Alimentos que debe limitar o evitar:  · Granos:     ? Meriendas elaboradas con aceites parcialmente hidrogenados, hamzah papitas fritas, galletas saladas regulares y palomitas de maíz con sabor a mantequilla  ? Productos de panadería con un alto contenido de grasa, hamzah [facility] [place] [facility], [place], donas, tartas, galletas y pasteles  · Productos lácteos:     ? Aicha Muck, leche con 2% de [place], y yogur y helado elaborados con [place] entera    ? Crema para el café, crema doble y crema batida    ? Carilyn Stains crema y crema agria    · Rebeccaside y proteínas:     ? Colin de carne con un alto contenido de grasa (hamzah [place], carne molida regular y costillas)    ? Carne de res, de ave (pavo y juliocesar) o de pescado frita    ? Es casimiro (juliocesar y pavo) con piel    ? Fiambres (kevin o mortadela tipo [facility]), perritos calientes, tocino y salchichas    ? Huevos enteros y yemas de [place]    · Verduras y frutas con grasa agregada:     ? Verduras fritas, con mantequilla o con salsas con un alto contenido de grasa, hamzah salsas de crema o de queso    ? Fruta frita o servida con mantequilla o crema    · Grasas:     ? Jonelle Rosales en kathy y [place]    ?  Aceite de dustin, de tracy y de [facility] Cent[facility] de tracy    Alimentos que puede incluir:  · Granos:     ? Panes, cereales y pastas de [place] integral y Jesu Dima integral    ? [place] y roscas saladas con bajo contenido de grasa    · Verduras y frutas:     ? Verduras frescas, congeladas o enlatadas (sin sal o con bajo contenido de [place])    ? Fruta fresca, seca o enlatada (en almíbar con poca azúcar o en jugo de fruta)    ? [place]    · Productos lácteos bajos en grasa:     ? Leche descremada (sin gordura) o con un 1% de grasa    ? Juan J Haji o con un bajo contenido de [place], yogur y Magi Thakur    · Rebeccaside y proteínas:     ? Carne de Euna Ag sin piel    ? Pescado horneado o asado    ? Carne de res o de cerdo magra (Carletha Overlie extra [place])    ? Frijoles y chícharos, nueces sin sal y productos de soya    ? Naomi Kava Mina y sustitutos de Mina    ? Semillas y nueces    · Grasas:     ? Aceites no saturados, hamzah de colza o can[place], de ocasio, de cacahuate, soya o girasol    ? Tharon Colonel y aceite vegetal para untar    ? Aderezo de ensaladas bajo en grasa    Otras formas de disminuir la cantidad de grasa en yusuf dieta:  · Maria G las etiquetas de los alimentos antes de comprarlos  Elija alimentos con menos de un 30% del total de calorías provenientes de la grasa  Elija productos lácteos con bajo contenido de grasa o descremados  Recuerde que el hecho de que un producto no contenga grasa no quiere decir que no tiene calorías  Estos alimentos tienen calorías y puede aumentar de peso si ingiere muchas calorías  · Recorte la grasa de las martha y no consuma alimentos fritos  Recorte toda la grasa visible de las martha antes de cocinarlas  Quite la piel de las martha de ave  No fría la carne, el [address] aves  Es preferible que cocine al horno, ase o hierva estos alimentos  Evite los alimentos fritos  Consuma pat papa al horno en lugar de jaquelin fritas  Coma verduras al vapor en lugar de verduras salteadas en mantequilla      · Agregue menos [place] a los alimentos  Use trocitos de imitación tocino en las ensaladas y en las jaquelin horneadas, en lugar de tocino de tammie  Utilice aderezos para ensaladas sin grasa o bajos en grasa en lugar de aderezos comunes  Use un aderezo con sabor a mantequilla con bajo contenido de grasa o sin grasa en lugar de mantequilla o de margarina sophie [address] y otros alimentos  Maneras de usar menos grasa en las recetas de cocina: Sustituya los ingredientes con un alto contenido de grasa por ingredientes con un bajo contenido de grasa o sin grasa  [place] puede [employer] productos horneados queden más secos que de [place]  Es posible que deba usar aceite en aerosol sin grasa en los moldes para evitar que los alimentos se peguen  Es posible que también deba modificar la cantidad de otros ingredientes de la receta, hamzah el [place]  Intente lo siguiente:  · Use margarina liviana o con bajo contenido de grasa en lugar de margarina común o manteca  · Use pechuga de pavo o carne de juliocesar Izzy, o carne de res Izzy (menos del 5% de grasa), en lugar de carne para hamburguesas  · Añada 1 cucharadita de aceite de can[place] o colza a 8 onzas de leche descremada en lugar de usar crema  · Use calabacines, zanahorias o manzanas ralladas en los panes en lugar de dustin     · Use requesón mezclado y bajo en grasa, tofú o queso ricota bajo en grasa en lugar de queso crema  · Use 1 rome de huevo y 1 cucharadita de aceite de canola o use ¼ taza (2 onzas) de sustituto de huevo sin grasa, en lugar de usar un huevo entero  · Al hornear, sustituya la mitad del aceite que requiere la receta por puré de Synchari  Use 3 cucharadas de cocoa en polvo y 1 cucharada de aceite de canola en lugar de un bloquecito de chocolate para hornear  Cómo aumentar la cantidad de [place] en yusuf dieta: Consuma suficientes alimentos ricos en fibra con el fin de ingerir entre 20 y 27 gramos de fibra a diario   Para evitar los cólicos estomacales, los gases y Ramón Faria, aumente gradualmente el consumo de [place]  · Consuma 3 onzas de alimentos integrales al día  Pat rebanada de pan pesa aproximadamente 1 onza  Coma panes integrales, hamzah el pan de peter integral  El peter integral, la harina de peter integral y otros granos integrales deben figurar hamzah el primer ingrediente en la etiqueta del producto  Sustituya la harina jakub por [place] integral o use mitad y mitad en mimi recetas  La harina integral es más pesada que la harina jakub, por lo cual es posible que deba agregar más levadura o polvo de hornear a yusuf receta  · Consuma un cereal rico en fibra en el desayuno  La harina de hilaria es pat buena kamran de fibra soluble  Busque los cereales que tengan la palabra salvado o fibra ("bran" o "fiber") en yusuf nombre  Elija productos con granos integrales, hamzah el arroz integral, la cebada y las pastas elaboradas con [place] integral     · Coma más frijoles, chícharos y lentejas  Agregue, por ejemplo, frijoles a las sopas o ensaladas  Consuma al menos 5 tazas de frutas o verduras a diario  Consuma las frutas y verduras sin pelar, ya que la nicci tiene un alto contenido de [place]  © Copyright [employer] 2021 Information is for End User's use only and may not be sold, redistributed or otherwise used for commercial purposes  All illustrations and images included in CareNotes® are the copyrighted property of A D A M , [employer]  or [address] [place] [address] es sólo para uso en educación  Yusuf intención no es darle un consejo médico sobre enfermedades o tratamientos  Colsulte con yusuf [employer] farm[employer] antes de seguir cualquier régimen médico para saber si es seguro y efectivo para usted

## 2022-02-14 NOTE — ASSESSMENT & PLAN NOTE
Reviewed most recent lipid panel (01/19/22): TC 2 21, , , HDL 34 ASCVD risk 0 9%   Continue with low fat diet and exercise  Weight loss counseling  No indication to start statin at this time  Weight loss counseling   Referral to weight management placed

## 2022-03-01 ENCOUNTER — APPOINTMENT (OUTPATIENT)
Dept: LAB | Facility: CLINIC | Age: 42
End: 2022-03-01
Payer: COMMERCIAL

## 2022-03-01 DIAGNOSIS — Z11.59 ENCOUNTER FOR HEPATITIS C SCREENING TEST FOR LOW RISK PATIENT: ICD-10-CM

## 2022-03-01 DIAGNOSIS — Z13.0 SCREENING FOR IRON DEFICIENCY ANEMIA: ICD-10-CM

## 2022-03-01 DIAGNOSIS — E56.9 VITAMIN DEFICIENCY: ICD-10-CM

## 2022-03-01 LAB
ANION GAP SERPL CALCULATED.3IONS-SCNC: 3 MMOL/L (ref 4–13)
BASOPHILS # BLD AUTO: 0.05 THOUSANDS/ΜL (ref 0–0.1)
BASOPHILS NFR BLD AUTO: 1 % (ref 0–1)
BUN SERPL-MCNC: 12 MG/DL (ref 5–25)
CALCIUM SERPL-MCNC: 9.5 MG/DL (ref 8.3–10.1)
CHLORIDE SERPL-SCNC: 105 MMOL/L (ref 100–108)
CO2 SERPL-SCNC: 29 MMOL/L (ref 21–32)
CREAT SERPL-MCNC: 0.97 MG/DL (ref 0.6–1.3)
EOSINOPHIL # BLD AUTO: 0.06 THOUSAND/ΜL (ref 0–0.61)
EOSINOPHIL NFR BLD AUTO: 1 % (ref 0–6)
ERYTHROCYTE [DISTWIDTH] IN BLOOD BY AUTOMATED COUNT: 12.8 % (ref 11.6–15.1)
FERRITIN SERPL-MCNC: 41 NG/ML (ref 8–388)
FOLATE SERPL-MCNC: 8.1 NG/ML (ref 3.1–17.5)
GFR SERPL CREATININE-BSD FRML MDRD: 72 ML/MIN/1.73SQ M
GLUCOSE P FAST SERPL-MCNC: 70 MG/DL (ref 65–99)
HCT VFR BLD AUTO: 45.7 % (ref 34.8–46.1)
HGB BLD-MCNC: 14.7 G/DL (ref 11.5–15.4)
IMM GRANULOCYTES # BLD AUTO: 0.01 THOUSAND/UL (ref 0–0.2)
IMM GRANULOCYTES NFR BLD AUTO: 0 % (ref 0–2)
IRON SATN MFR SERPL: 32 % (ref 15–50)
IRON SERPL-MCNC: 104 UG/DL (ref 50–170)
LYMPHOCYTES # BLD AUTO: 2.22 THOUSANDS/ΜL (ref 0.6–4.47)
LYMPHOCYTES NFR BLD AUTO: 34 % (ref 14–44)
MCH RBC QN AUTO: 30.7 PG (ref 26.8–34.3)
MCHC RBC AUTO-ENTMCNC: 32.2 G/DL (ref 31.4–37.4)
MCV RBC AUTO: 95 FL (ref 82–98)
MONOCYTES # BLD AUTO: 0.55 THOUSAND/ΜL (ref 0.17–1.22)
MONOCYTES NFR BLD AUTO: 8 % (ref 4–12)
NEUTROPHILS # BLD AUTO: 3.67 THOUSANDS/ΜL (ref 1.85–7.62)
NEUTS SEG NFR BLD AUTO: 56 % (ref 43–75)
NRBC BLD AUTO-RTO: 0 /100 WBCS
PLATELET # BLD AUTO: 357 THOUSANDS/UL (ref 149–390)
PMV BLD AUTO: 9.9 FL (ref 8.9–12.7)
POTASSIUM SERPL-SCNC: 4.3 MMOL/L (ref 3.5–5.3)
RBC # BLD AUTO: 4.79 MILLION/UL (ref 3.81–5.12)
SODIUM SERPL-SCNC: 137 MMOL/L (ref 136–145)
TIBC SERPL-MCNC: 325 UG/DL (ref 250–450)
VIT B12 SERPL-MCNC: 237 PG/ML (ref 100–900)
WBC # BLD AUTO: 6.56 THOUSAND/UL (ref 4.31–10.16)

## 2022-03-01 PROCEDURE — 82746 ASSAY OF FOLIC ACID SERUM: CPT

## 2022-03-01 PROCEDURE — 86803 HEPATITIS C AB TEST: CPT

## 2022-03-01 PROCEDURE — 83540 ASSAY OF IRON: CPT

## 2022-03-01 PROCEDURE — 85025 COMPLETE CBC W/AUTO DIFF WBC: CPT

## 2022-03-01 PROCEDURE — 80048 BASIC METABOLIC PNL TOTAL CA: CPT

## 2022-03-01 PROCEDURE — 83550 IRON BINDING TEST: CPT

## 2022-03-01 PROCEDURE — 82607 VITAMIN B-12: CPT

## 2022-03-01 PROCEDURE — 82728 ASSAY OF FERRITIN: CPT

## 2022-03-01 PROCEDURE — 36415 COLL VENOUS BLD VENIPUNCTURE: CPT

## 2022-03-02 DIAGNOSIS — E78.6 LOW HDL (UNDER 40): ICD-10-CM

## 2022-03-02 DIAGNOSIS — E53.8 B12 DEFICIENCY: Primary | ICD-10-CM

## 2022-03-02 DIAGNOSIS — E78.2 MIXED HYPERLIPIDEMIA: ICD-10-CM

## 2022-03-02 PROBLEM — L25.9 DERMATITIS VENENATA: Status: RESOLVED | Noted: 2019-07-10 | Resolved: 2022-03-02

## 2022-03-02 LAB — HCV AB SER QL: NORMAL

## 2023-09-08 ENCOUNTER — HOSPITAL ENCOUNTER (OUTPATIENT)
Dept: MAMMOGRAPHY | Facility: CLINIC | Age: 43
End: 2023-09-08
Payer: COMMERCIAL

## 2023-09-08 VITALS — BODY MASS INDEX: 29.19 KG/M2 | WEIGHT: 171 LBS | HEIGHT: 64 IN

## 2023-09-08 DIAGNOSIS — Z12.31 VISIT FOR SCREENING MAMMOGRAM: ICD-10-CM

## 2023-09-08 PROCEDURE — 77067 SCR MAMMO BI INCL CAD: CPT

## 2023-09-08 PROCEDURE — 77063 BREAST TOMOSYNTHESIS BI: CPT

## 2024-08-07 ENCOUNTER — OFFICE VISIT (OUTPATIENT)
Dept: FAMILY MEDICINE CLINIC | Facility: CLINIC | Age: 44
End: 2024-08-07

## 2024-08-07 VITALS
OXYGEN SATURATION: 99 % | WEIGHT: 184 LBS | HEIGHT: 64 IN | DIASTOLIC BLOOD PRESSURE: 72 MMHG | HEART RATE: 63 BPM | TEMPERATURE: 97.6 F | RESPIRATION RATE: 18 BRPM | BODY MASS INDEX: 31.41 KG/M2 | SYSTOLIC BLOOD PRESSURE: 110 MMHG

## 2024-08-07 DIAGNOSIS — E66.09 CLASS 1 OBESITY DUE TO EXCESS CALORIES WITHOUT SERIOUS COMORBIDITY WITH BODY MASS INDEX (BMI) OF 31.0 TO 31.9 IN ADULT: ICD-10-CM

## 2024-08-07 DIAGNOSIS — Z00.00 ANNUAL PHYSICAL EXAM: Primary | ICD-10-CM

## 2024-08-07 PROCEDURE — 99396 PREV VISIT EST AGE 40-64: CPT | Performed by: FAMILY MEDICINE

## 2024-08-07 NOTE — PROGRESS NOTES
Adult Annual Physical  Name: Latoya Trujillo      : 1980      MRN: [id number]  Encounter Provider: Diego Kee MD  Encounter Date: 2024   Encounter department: [facility]    Assessment & Plan   1. Annual physical exam  -     Quantiferon TB Gold Plus Assay; Future  -     Mammo screening bilateral w 3d & cad; Future  -     Comprehensive metabolic panel; Future  -     Lipid panel; Future  -     CBC and differential; Future  2. Class 1 obesity due to excess calories without serious comorbidity with body mass index (BMI) of 31.0 to 31.9 in adult  -     Lipid panel; Future  -     TSH w/Reflex; Future    Immunizations and preventive care screenings were discussed with patient today. Appropriate education was printed on patient's after visit summary.    Counseling:  Alcohol/drug use: discussed moderation in alcohol intake, the recommendations for healthy alcohol use, and avoidance of illicit drug use.  Sexual health: discussed sexually transmitted diseases, partner selection, use of condoms, avoidance of unintended pregnancy, and contraceptive alternatives.  Exercise: the importance of regular exercise/physical activity was discussed. Recommend exercise 3-5 times per week for at least 30 minutes.     BMI Counseling: Body mass index is 31.58 kg/m². The BMI is above normal. Nutrition recommendations include decreasing portion sizes, decreasing fast food intake, consuming healthier snacks and increasing intake of lean protein. Exercise recommendations include exercising 3-5 times per week. Rationale for BMI follow-up plan is due to patient being overweight or obese.     Depression Screening and Follow-up Plan: Patient was screened for depression during today's encounter. They screened negative with a PHQ-2 score of 0.        History of Present Illness     Adult Annual Physical:  Patient presents for annual physical. Latoya is a 44 y.o female with hyperlipidemia,  "prediabetes and allergic rhinitis who presents today for annual physical.  .     Diet and Physical Activity:  - Diet/Nutrition: well balanced diet and consuming 3-5 servings of fruits/vegetables daily. nut struggles to loose weight  - Exercise: 3-4 times a week on average.    Depression Screening:  - PHQ-2 Score: 0    General Health:  - Sleep: 7-8 hours of sleep on average.  - Hearing: normal hearing bilateral ears.  - Vision: wears glasses and most recent eye exam < 1 year ago.  - Dental: regular dental visits and brushes teeth three times daily.    /GYN Health:  - Follows with GYN: no.   - Last menstrual cycle: 8/5/2024.   - History of STDs: no  - Contraception:. tubal ligation 13 ago      Review of Systems   Constitutional:  Negative for fever.   Respiratory:  Negative for cough and shortness of breath.    Cardiovascular:  Negative for chest pain and palpitations.   Gastrointestinal:  Negative for abdominal pain and vomiting.   Genitourinary:  Negative for dysuria and hematuria.   Skin:  Negative for rash.   All other systems reviewed and are negative.        Objective     /72 (BP Location: Right arm, Patient Position: Sitting, Cuff Size: Standard)   Pulse 63   Temp 97.6 °F (36.4 °C) (Temporal)   Resp 18   Ht 5' 4\" (1.626 m)   Wt 83.5 kg (184 lb)   SpO2 99%   BMI 31.58 kg/m²     Physical Exam  Constitutional:       General: She is not in acute distress.     Appearance: She is obese. She is not ill-appearing or toxic-appearing.   HENT:      Right Ear: Tympanic membrane and external ear normal.      Left Ear: Tympanic membrane and external ear normal.      Nose: Nose normal.   Eyes:      General: No scleral icterus.     Conjunctiva/sclera: Conjunctivae normal.   Cardiovascular:      Rate and Rhythm: Normal rate and regular rhythm.      Heart sounds: No murmur heard.     No gallop.   Pulmonary:      Effort: Pulmonary effort is normal. No respiratory distress.      Breath sounds: No wheezing or " rhonchi.   Musculoskeletal:      Right lower leg: No edema.      Left lower leg: No edema.   Skin:     General: Skin is warm and dry.      Capillary Refill: Capillary refill takes less than 2 seconds.   Neurological:      General: No focal deficit present.      Mental Status: She is alert and oriented to person, place, and time.   Psychiatric:         Mood and Affect: Mood normal.         Behavior: Behavior normal.

## 2024-09-04 ENCOUNTER — APPOINTMENT (OUTPATIENT)
Dept: LAB | Facility: CLINIC | Age: 44
End: 2024-09-04
Payer: COMMERCIAL

## 2024-09-04 DIAGNOSIS — E66.09 CLASS 1 OBESITY DUE TO EXCESS CALORIES WITHOUT SERIOUS COMORBIDITY WITH BODY MASS INDEX (BMI) OF 31.0 TO 31.9 IN ADULT: ICD-10-CM

## 2024-09-04 DIAGNOSIS — Z00.00 ANNUAL PHYSICAL EXAM: ICD-10-CM

## 2024-09-04 LAB
ALBUMIN SERPL BCG-MCNC: 3.9 G/DL (ref 3.5–5)
ALP SERPL-CCNC: 71 U/L (ref 34–104)
ALT SERPL W P-5'-P-CCNC: 14 U/L (ref 7–52)
ANION GAP SERPL CALCULATED.3IONS-SCNC: 10 MMOL/L (ref 4–13)
AST SERPL W P-5'-P-CCNC: 19 U/L (ref 13–39)
BASOPHILS # BLD AUTO: 0.05 THOUSANDS/ÂΜL (ref 0–0.1)
BASOPHILS NFR BLD AUTO: 1 % (ref 0–1)
BILIRUB SERPL-MCNC: 0.53 MG/DL (ref 0.2–1)
BUN SERPL-MCNC: 11 MG/DL (ref 5–25)
CALCIUM SERPL-MCNC: 8.9 MG/DL (ref 8.4–10.2)
CHLORIDE SERPL-SCNC: 102 MMOL/L (ref 96–108)
CHOLEST SERPL-MCNC: 184 MG/DL
CO2 SERPL-SCNC: 26 MMOL/L (ref 21–32)
CREAT SERPL-MCNC: 0.92 MG/DL (ref 0.6–1.3)
EOSINOPHIL # BLD AUTO: 0.03 THOUSAND/ÂΜL (ref 0–0.61)
EOSINOPHIL NFR BLD AUTO: 1 % (ref 0–6)
ERYTHROCYTE [DISTWIDTH] IN BLOOD BY AUTOMATED COUNT: 12.8 % (ref 11.6–15.1)
GFR SERPL CREATININE-BSD FRML MDRD: 75 ML/MIN/1.73SQ M
GLUCOSE P FAST SERPL-MCNC: 84 MG/DL (ref 65–99)
HCT VFR BLD AUTO: 42.2 % (ref 34.8–46.1)
HDLC SERPL-MCNC: 31 MG/DL
HGB BLD-MCNC: 13.7 G/DL (ref 11.5–15.4)
IMM GRANULOCYTES # BLD AUTO: 0.02 THOUSAND/UL (ref 0–0.2)
IMM GRANULOCYTES NFR BLD AUTO: 0 % (ref 0–2)
LDLC SERPL CALC-MCNC: 107 MG/DL (ref 0–100)
LYMPHOCYTES # BLD AUTO: 2.03 THOUSANDS/ÂΜL (ref 0.6–4.47)
LYMPHOCYTES NFR BLD AUTO: 31 % (ref 14–44)
MCH RBC QN AUTO: 31.3 PG (ref 26.8–34.3)
MCHC RBC AUTO-ENTMCNC: 32.5 G/DL (ref 31.4–37.4)
MCV RBC AUTO: 96 FL (ref 82–98)
MONOCYTES # BLD AUTO: 0.49 THOUSAND/ÂΜL (ref 0.17–1.22)
MONOCYTES NFR BLD AUTO: 7 % (ref 4–12)
NEUTROPHILS # BLD AUTO: 3.97 THOUSANDS/ÂΜL (ref 1.85–7.62)
NEUTS SEG NFR BLD AUTO: 60 % (ref 43–75)
NONHDLC SERPL-MCNC: 153 MG/DL
NRBC BLD AUTO-RTO: 0 /100 WBCS
PLATELET # BLD AUTO: 377 THOUSANDS/UL (ref 149–390)
PMV BLD AUTO: 10.3 FL (ref 8.9–12.7)
POTASSIUM SERPL-SCNC: 4 MMOL/L (ref 3.5–5.3)
PROT SERPL-MCNC: 7 G/DL (ref 6.4–8.4)
RBC # BLD AUTO: 4.38 MILLION/UL (ref 3.81–5.12)
SODIUM SERPL-SCNC: 138 MMOL/L (ref 135–147)
TRIGL SERPL-MCNC: 230 MG/DL
TSH SERPL DL<=0.05 MIU/L-ACNC: 2.69 UIU/ML (ref 0.45–4.5)
WBC # BLD AUTO: 6.59 THOUSAND/UL (ref 4.31–10.16)

## 2024-09-04 PROCEDURE — 86480 TB TEST CELL IMMUN MEASURE: CPT

## 2024-09-04 PROCEDURE — 84443 ASSAY THYROID STIM HORMONE: CPT

## 2024-09-04 PROCEDURE — 80053 COMPREHEN METABOLIC PANEL: CPT

## 2024-09-04 PROCEDURE — 85025 COMPLETE CBC W/AUTO DIFF WBC: CPT

## 2024-09-04 PROCEDURE — 36415 COLL VENOUS BLD VENIPUNCTURE: CPT

## 2024-09-04 PROCEDURE — 80061 LIPID PANEL: CPT

## 2024-09-05 LAB
GAMMA INTERFERON BACKGROUND BLD IA-ACNC: 0.1 IU/ML
M TB IFN-G BLD-IMP: NEGATIVE
M TB IFN-G CD4+ BCKGRND COR BLD-ACNC: 0 IU/ML
M TB IFN-G CD4+ BCKGRND COR BLD-ACNC: 0.01 IU/ML
MITOGEN IGNF BCKGRD COR BLD-ACNC: 4.05 IU/ML

## 2024-11-14 ENCOUNTER — OFFICE VISIT (OUTPATIENT)
Dept: FAMILY MEDICINE CLINIC | Facility: CLINIC | Age: 44
End: 2024-11-14

## 2024-11-14 VITALS
WEIGHT: 185.38 LBS | TEMPERATURE: 97.8 F | HEART RATE: 75 BPM | DIASTOLIC BLOOD PRESSURE: 72 MMHG | RESPIRATION RATE: 18 BRPM | HEIGHT: 64 IN | OXYGEN SATURATION: 97 % | BODY MASS INDEX: 31.65 KG/M2 | SYSTOLIC BLOOD PRESSURE: 107 MMHG

## 2024-11-14 DIAGNOSIS — E66.09 CLASS 1 OBESITY DUE TO EXCESS CALORIES WITHOUT SERIOUS COMORBIDITY WITH BODY MASS INDEX (BMI) OF 31.0 TO 31.9 IN ADULT: Primary | ICD-10-CM

## 2024-11-14 DIAGNOSIS — E66.811 CLASS 1 OBESITY DUE TO EXCESS CALORIES WITHOUT SERIOUS COMORBIDITY WITH BODY MASS INDEX (BMI) OF 31.0 TO 31.9 IN ADULT: Primary | ICD-10-CM

## 2024-11-14 NOTE — PROGRESS NOTES
Ambulatory Visit  Name: Latoya Trujillo      : 1980      MRN: [id number]  Encounter Provider: Diego Kee MD  Encounter Date: 2024   Encounter department: [facility]    Assessment & Plan  Class 1 obesity due to excess calories without serious comorbidity with body mass index (BMI) of 31.0 to 31.9 in adult  Patient admits to overeat due to stress, however she is now motivated to start on lifestyle modifications   Patient interested in nutritional counseling   She bought gym equipment for home workouts to fit her schedule     Plan:  Lifestyle modifications trial   Will consider weight loss medication in the future if no improvement   Nutrition services referral  Counseling about healthier ways the cope with stress provided   Follow up in 2 months     Orders:    Ambulatory Referral to Nutrition Services; Future       History of Present Illness     Latoya is a44 y.o female with PMH of prediabetes, HLD and obesity who presents today to discuss weight loss.     Barrier to weight loss: Stress eating.  Exercise:  Bought a treadmill and resistance bands to work out a home   Diet: has been stress eating.  Patient thinks she needs guidance of a nutritionist   Weight goal: lose 10 pounds in 3 months.     Measurements:  Waist: 103.5 cm  Neck: 35 cm   Weight: 185 lbs     Patient has normal levels of TSH.           Review of Systems   Constitutional:  Negative for activity change, appetite change, fatigue and unexpected weight change.   Respiratory:  Negative for shortness of breath.    Cardiovascular:  Negative for chest pain.   Gastrointestinal:  Negative for abdominal pain and constipation.   Endocrine: Negative for cold intolerance, heat intolerance, polydipsia, polyphagia and polyuria.   Neurological:  Negative for light-headedness and headaches.   Psychiatric/Behavioral:  Negative for sleep disturbance.            Objective     /72 (BP Location: Left arm,  "Patient Position: Sitting, Cuff Size: Large)   Pulse 75   Temp 97.8 °F (36.6 °C) (Temporal)   Resp 18   Ht 5' 4\" (1.626 m)   Wt 84.1 kg (185 lb 6 oz)   LMP 11/06/2024 (Exact Date)   SpO2 97%   BMI 31.82 kg/m²     Physical Exam  Constitutional:       General: She is not in acute distress.     Appearance: She is not ill-appearing or toxic-appearing.   HENT:      Right Ear: External ear normal.      Left Ear: External ear normal.      Nose: Nose normal.   Eyes:      General: No scleral icterus.     Conjunctiva/sclera: Conjunctivae normal.   Cardiovascular:      Rate and Rhythm: Normal rate and regular rhythm.      Heart sounds: No murmur heard.     No gallop.   Pulmonary:      Effort: Pulmonary effort is normal. No respiratory distress.      Breath sounds: No wheezing or rhonchi.   Abdominal:      General: There is no distension.      Palpations: Abdomen is soft.      Tenderness: There is no abdominal tenderness. There is no guarding.   Musculoskeletal:      Right lower leg: No edema.      Left lower leg: No edema.   Skin:     General: Skin is warm and dry.      Capillary Refill: Capillary refill takes less than 2 seconds.   Neurological:      General: No focal deficit present.      Mental Status: She is alert and oriented to person, place, and time.   Psychiatric:         Mood and Affect: Mood normal.         Behavior: Behavior normal.         "

## 2024-11-14 NOTE — ASSESSMENT & PLAN NOTE
Patient admits to overeat due to stress, however she is now motivated to start on lifestyle modifications   Patient interested in nutritional counseling   She bought gym equipment for home workouts to fit her schedule     Plan:  Lifestyle modifications trial   Will consider weight loss medication in the future if no improvement   Nutrition services referral  Counseling about healthier ways the cope with stress provided   Follow up in 2 months     Orders:    Ambulatory Referral to Nutrition Services; Future

## 2025-01-17 ENCOUNTER — HOSPITAL ENCOUNTER (OUTPATIENT)
Dept: MAMMOGRAPHY | Facility: CLINIC | Age: 45
End: 2025-01-17
Payer: COMMERCIAL

## 2025-01-17 VITALS — HEIGHT: 64 IN | WEIGHT: 185 LBS | BODY MASS INDEX: 31.58 KG/M2

## 2025-01-17 DIAGNOSIS — Z00.00 ANNUAL PHYSICAL EXAM: ICD-10-CM

## 2025-01-17 PROCEDURE — 77067 SCR MAMMO BI INCL CAD: CPT

## 2025-01-17 PROCEDURE — 77063 BREAST TOMOSYNTHESIS BI: CPT

## 2025-01-22 ENCOUNTER — APPOINTMENT (OUTPATIENT)
Dept: LAB | Facility: CLINIC | Age: 45
End: 2025-01-22
Payer: COMMERCIAL

## 2025-01-22 ENCOUNTER — OFFICE VISIT (OUTPATIENT)
Dept: FAMILY MEDICINE CLINIC | Facility: CLINIC | Age: 45
End: 2025-01-22

## 2025-01-22 VITALS
HEIGHT: 64 IN | WEIGHT: 185.5 LBS | OXYGEN SATURATION: 99 % | DIASTOLIC BLOOD PRESSURE: 68 MMHG | RESPIRATION RATE: 18 BRPM | SYSTOLIC BLOOD PRESSURE: 108 MMHG | TEMPERATURE: 97.7 F | BODY MASS INDEX: 31.67 KG/M2 | HEART RATE: 74 BPM

## 2025-01-22 DIAGNOSIS — N93.9 ABNORMAL UTERINE BLEEDING (AUB): Primary | ICD-10-CM

## 2025-01-22 DIAGNOSIS — N93.9 ABNORMAL UTERINE BLEEDING (AUB): ICD-10-CM

## 2025-01-22 LAB
BASOPHILS # BLD AUTO: 0.05 THOUSANDS/ΜL (ref 0–0.1)
BASOPHILS NFR BLD AUTO: 1 % (ref 0–1)
EOSINOPHIL # BLD AUTO: 0.03 THOUSAND/ΜL (ref 0–0.61)
EOSINOPHIL NFR BLD AUTO: 1 % (ref 0–6)
ERYTHROCYTE [DISTWIDTH] IN BLOOD BY AUTOMATED COUNT: 12.6 % (ref 11.6–15.1)
HCG SERPL QL: NEGATIVE
HCT VFR BLD AUTO: 44.4 % (ref 34.8–46.1)
HGB BLD-MCNC: 14.1 G/DL (ref 11.5–15.4)
IMM GRANULOCYTES # BLD AUTO: 0.01 THOUSAND/UL (ref 0–0.2)
IMM GRANULOCYTES NFR BLD AUTO: 0 % (ref 0–2)
LYMPHOCYTES # BLD AUTO: 2.24 THOUSANDS/ΜL (ref 0.6–4.47)
LYMPHOCYTES NFR BLD AUTO: 36 % (ref 14–44)
MCH RBC QN AUTO: 30.7 PG (ref 26.8–34.3)
MCHC RBC AUTO-ENTMCNC: 31.8 G/DL (ref 31.4–37.4)
MCV RBC AUTO: 97 FL (ref 82–98)
MONOCYTES # BLD AUTO: 0.57 THOUSAND/ΜL (ref 0.17–1.22)
MONOCYTES NFR BLD AUTO: 9 % (ref 4–12)
NEUTROPHILS # BLD AUTO: 3.38 THOUSANDS/ΜL (ref 1.85–7.62)
NEUTS SEG NFR BLD AUTO: 53 % (ref 43–75)
NRBC BLD AUTO-RTO: 0 /100 WBCS
PLATELET # BLD AUTO: 389 THOUSANDS/UL (ref 149–390)
PMV BLD AUTO: 10.2 FL (ref 8.9–12.7)
RBC # BLD AUTO: 4.59 MILLION/UL (ref 3.81–5.12)
TSH SERPL DL<=0.05 MIU/L-ACNC: 2.86 UIU/ML (ref 0.45–4.5)
WBC # BLD AUTO: 6.28 THOUSAND/UL (ref 4.31–10.16)

## 2025-01-22 PROCEDURE — 85025 COMPLETE CBC W/AUTO DIFF WBC: CPT

## 2025-01-22 PROCEDURE — 36415 COLL VENOUS BLD VENIPUNCTURE: CPT

## 2025-01-22 PROCEDURE — 99212 OFFICE O/P EST SF 10 MIN: CPT | Performed by: FAMILY MEDICINE

## 2025-01-22 PROCEDURE — 84443 ASSAY THYROID STIM HORMONE: CPT

## 2025-01-22 PROCEDURE — 84703 CHORIONIC GONADOTROPIN ASSAY: CPT

## 2025-01-22 NOTE — ASSESSMENT & PLAN NOTE
Patient admits to still overeat due to stress  She has failed lifestyle modifications 185 lbs >   Following with nutritional services  She bought gym equipment for home workouts to fit her schedule      Plan:

## 2025-01-22 NOTE — ASSESSMENT & PLAN NOTE
Unclear cause  Rule out thyroid dysfunction, fibroids, endometrium dysfunction, pregnancy, perimenopausal hormonal changes  Will order TSH, CBC, and Transvaginal US  Will consider starting OCP if symptoms persists   Return in 2 weeks for follow up, pelvic exam and pap smear     Orders:    US pelvis complete w transvaginal; Future    CBC and differential; Future    TSH w/Reflex; Future    Pregnancy Test (HCG Qualitative); Future

## 2025-01-22 NOTE — PROGRESS NOTES
Ambulatory Visit  Name: Latoya Trujillo      : 1980      MRN: [id number]  Encounter Provider: Diego Kee MD  Encounter Date: 2025   Encounter department: [facility]    Assessment & Plan  Abnormal uterine bleeding (AUB)  Unclear cause  Rule out thyroid dysfunction, fibroids, endometrium dysfunction, pregnancy, perimenopausal hormonal changes  Will order TSH, CBC, and Transvaginal US  Will consider starting OCP if symptoms persists   Return in 2 weeks for follow up, pelvic exam and pap smear     Orders:    US pelvis complete w transvaginal; Future    CBC and differential; Future    TSH w/Reflex; Future    Pregnancy Test (HCG Qualitative); Future    BMI Counseling: Body mass index is 31.84 kg/m². The BMI is above normal. Nutrition recommendations include decreasing portion sizes, decreasing fast food intake, limiting drinks that contain sugar and increasing intake of lean protein. Exercise recommendations include exercising 3-5 times per week. Rationale for BMI follow-up plan is due to patient being overweight or obese.       History of Present Illness     Patient presents today with complaints of abnormal uterine bleeding.     Patient reports spotting throughout this month. She states that December 15 th she had a normal period lasting 7 days.  she started with some spotting for 3 days, then  she reports having an actual menstrual period that lasted until . Then on  the spotting returned until  when she started bleeding like her normal period. She reports bleeding until now.     Patient reports a tubal ligation 13 years. She is sexually active, but has not had intercourse in the past 2 months.     Patient reports that her menstrual periods have always been regular. Every 28 days lasting 7 days. Not too heavy.     Denies family history of endometrium, ovary or cervical cancer.    Last pap smear on 2020:  "Negative  CBC and TSH on 9/2024 wnl             Review of Systems   Constitutional:  Negative for activity change, appetite change, fatigue and unexpected weight change.   Respiratory:  Negative for cough and shortness of breath.    Cardiovascular:  Negative for chest pain, palpitations and leg swelling.   Gastrointestinal:  Negative for abdominal pain, constipation, diarrhea, nausea and vomiting.   Endocrine: Negative for cold intolerance, heat intolerance, polydipsia, polyphagia and polyuria.   Genitourinary:  Positive for menstrual problem. Negative for genital sores, vaginal discharge and vaginal pain.   Skin:  Negative for rash.   Neurological:  Negative for dizziness, light-headedness and headaches.   Psychiatric/Behavioral:  Negative for sleep disturbance.            Objective     /68 (BP Location: Left arm, Patient Position: Sitting, Cuff Size: Standard)   Pulse 74   Temp 97.7 °F (36.5 °C) (Temporal)   Resp 18   Ht 5' 4\" (1.626 m)   Wt 84.1 kg (185 lb 8 oz)   LMP 01/02/2025 (Exact Date)   SpO2 99%   BMI 31.84 kg/m²     Physical Exam  Constitutional:       General: She is not in acute distress.     Appearance: She is not ill-appearing.   HENT:      Right Ear: External ear normal.      Left Ear: External ear normal.      Nose: Nose normal.      Mouth/Throat:      Mouth: Mucous membranes are moist.      Pharynx: Oropharynx is clear. No oropharyngeal exudate or posterior oropharyngeal erythema.   Eyes:      General: No scleral icterus.     Conjunctiva/sclera: Conjunctivae normal.   Neck:      Thyroid: No thyroid mass, thyromegaly or thyroid tenderness.   Cardiovascular:      Rate and Rhythm: Normal rate and regular rhythm.      Heart sounds: No murmur heard.     No gallop.   Pulmonary:      Effort: Pulmonary effort is normal. No respiratory distress.      Breath sounds: No wheezing or rhonchi.   Abdominal:      General: There is no distension.      Palpations: Abdomen is soft.      Tenderness: " There is no abdominal tenderness. There is no guarding.   Musculoskeletal:      Right lower leg: No edema.      Left lower leg: No edema.   Skin:     General: Skin is warm and dry.      Capillary Refill: Capillary refill takes less than 2 seconds.   Neurological:      General: No focal deficit present.      Mental Status: She is alert and oriented to person, place, and time.   Psychiatric:         Mood and Affect: Mood normal.         Behavior: Behavior normal.

## 2025-01-24 ENCOUNTER — RESULTS FOLLOW-UP (OUTPATIENT)
Dept: FAMILY MEDICINE CLINIC | Facility: CLINIC | Age: 45
End: 2025-01-24

## 2025-01-27 ENCOUNTER — RESULTS FOLLOW-UP (OUTPATIENT)
Dept: FAMILY MEDICINE CLINIC | Facility: CLINIC | Age: 45
End: 2025-01-27

## 2025-01-29 ENCOUNTER — HOSPITAL ENCOUNTER (OUTPATIENT)
Dept: ULTRASOUND IMAGING | Facility: HOSPITAL | Age: 45
Discharge: HOME | End: 2025-01-29
Payer: COMMERCIAL

## 2025-01-29 DIAGNOSIS — N93.9 ABNORMAL UTERINE BLEEDING (AUB): ICD-10-CM

## 2025-01-29 PROCEDURE — 76830 TRANSVAGINAL US NON-OB: CPT

## 2025-01-29 PROCEDURE — 76856 US EXAM PELVIC COMPLETE: CPT

## 2025-01-30 NOTE — ASSESSMENT & PLAN NOTE
BMI Counseling: Body mass index is 32 77 kg/m²  The BMI is above normal  Nutrition recommendations include reducing portion sizes, decreasing overall calorie intake, 3-5 servings of fruits/vegetables daily, reducing fast food intake and consuming healthier snacks  Exercise recommendations include exercising 3-5 times per week      Patient agreeable to lose weight and will get a trademill at home to increase her daily exercise   Healthy lifestyle in [place] attached to AVS Warm

## 2025-02-03 NOTE — PROGRESS NOTES
ANNUAL GYNECOLOGICAL EXAMINATION    Latoya Trujillo is a 44 y.o. female who presents today for annual GYN exam.  Her last pap smear was performed in  and result was normal.  She reports no history of abnormal pap smears in her past.  Her last mammogram was performed on 2025 and result was normal.  She had HIV screening performed in  and it was negative.  She reports menses as irregular recently. She is currently getting work up for AUB.  Patient reports the vaginal bleeding stopped on 3 February.  Pelvis complete with transvaginal ultrasound was significant for mild thickening of endometrial, diffuse adenomyosis, a complex cervical lesion measuring 1.9 x 1.5 x 1.9 cm and a 2.4 cm left intravaginal multilocular cystic lesion.  Findings were discussed with patient and patient agreed on follow-up with gynecology upstairs as soon as possible.  Patient denies any pelvic pain, vaginal discharge, dysmenorrhea, or pain with intercourse. Her general medical history has been reviewed and she reports it as follows:    History reviewed. No pertinent past medical history.  Past Surgical History:   Procedure Laterality Date    TUBAL LIGATION       OB History          2    Para   2    Term                AB        Living   2         SAB        IAB        Ectopic        Multiple        Live Births   2               Social History     Tobacco Use    Smoking status: Never     Passive exposure: Never    Smokeless tobacco: Never   Vaping Use    Vaping status: Never Used   Substance Use Topics    Alcohol use: Not Currently     Comment: last used     Drug use: Never     Social History     Substance and Sexual Activity   Sexual Activity Yes    Partners: Male    Birth control/protection: Female Sterilization     Cancer-related family history is negative for Breast cancer.    No current outpatient medications    Review of Systems:  Review of Systems   Constitutional:  Negative for fever.   Respiratory:   "Negative for shortness of breath.    Gastrointestinal:  Negative for abdominal pain, constipation, diarrhea, nausea, rectal pain and vomiting.   Endocrine: Negative for cold intolerance and heat intolerance.   Genitourinary:  Positive for menstrual problem. Negative for decreased urine volume, dyspareunia, dysuria, genital sores, hematuria, pelvic pain, urgency, vaginal bleeding, vaginal discharge and vaginal pain.   Skin:  Negative for rash.   Neurological:  Negative for headaches.   All other systems reviewed and are negative.      Physical Exam:  /80 (BP Location: Right arm, Patient Position: Sitting, Cuff Size: Standard)   Pulse 63   Temp 97.6 °F (36.4 °C) (Temporal)   Resp 16   Ht 5' 4\" (1.626 m)   Wt 83.5 kg (184 lb)   LMP 01/02/2025 (Exact Date)   SpO2 95%   BMI 31.58 kg/m²   Physical Exam  Constitutional:       General: She is not in acute distress.     Appearance: Normal appearance. She is not ill-appearing.   Genitourinary:      Genitourinary Comments: Mild bleeding after Pap smear noted.   Nipples are inverted bilaterally.  Patient reports that nipples have always been like that.   Normal recent mammogram      Right Labia: No lesions.     Left Labia: No tenderness.     Pelvic Rich Score: 5/5.     No vaginal discharge, tenderness or bleeding.        Right Adnexa: not tender.     Left Adnexa: not tender.     No cervical discharge, friability or polyp.      No urethral tenderness present.   Breasts:     Breasts are symmetrical.      Right: Inverted nipple present. No nipple discharge, skin change or tenderness.      Left: Inverted nipple present. No nipple discharge, skin change or tenderness.   HENT:      Head: Normocephalic and atraumatic.      Right Ear: External ear normal.      Left Ear: External ear normal.      Nose: Nose normal.   Eyes:      General: No scleral icterus.     Conjunctiva/sclera: Conjunctivae normal.   Neck:      Thyroid: No thyroid mass, thyromegaly or thyroid " tenderness.   Cardiovascular:      Rate and Rhythm: Normal rate and regular rhythm.      Pulses: Normal pulses.      Heart sounds: Normal heart sounds. No murmur heard.     No gallop.   Pulmonary:      Effort: Pulmonary effort is normal. No respiratory distress.      Breath sounds: No wheezing or rhonchi.   Abdominal:      General: Bowel sounds are normal. There is no distension.      Palpations: Abdomen is soft. There is no mass.      Tenderness: There is no abdominal tenderness. There is no guarding.   Musculoskeletal:      Right lower leg: No edema.      Left lower leg: No edema.   Lymphadenopathy:      Upper Body:      Right upper body: No supraclavicular or axillary adenopathy.      Left upper body: No supraclavicular or axillary adenopathy.   Neurological:      General: No focal deficit present.      Mental Status: She is alert and oriented to person, place, and time.   Skin:     General: Skin is warm and dry.      Capillary Refill: Capillary refill takes less than 2 seconds.   Psychiatric:         Mood and Affect: Mood normal.         Behavior: Behavior normal.           Assessment/Plan:   1. Normal well-woman GYN exam.  Cervical cancer screening:  Normal cervical exam.  Pap smear done with HPV co-testing.   STD screening: Orders placed for vaginal GC/CT cultures   Breast cancer screening: Mammogram up to date   Colon cancer screening:  Order placed for consultation with Gastroenterology for consultation to discuss screening.   Depression Screening: Patient's depression screening was assessed with a PHQ-2 score of 0.    BMI Counseling: Body mass index is 31.58 kg/m². Discussed the patient's BMI with her. The BMI is above normal. Nutrition recommendations include reducing portion sizes, 3-5 servings of fruits/vegetables daily, consuming healthier snacks, increasing intake of lean protein, and reducing intake of cholesterol.              Return to office in 6 months for management of chronic conditions or  before if needed.    Reviewed with patient that test results are available in MyChart immediately, but that they will not necessarily be reviewed by me immediately.  Explained that I will review results at my earliest opportunity and contact patient appropriately.

## 2025-02-05 ENCOUNTER — ANNUAL EXAM (OUTPATIENT)
Dept: FAMILY MEDICINE CLINIC | Facility: CLINIC | Age: 45
End: 2025-02-05

## 2025-02-05 ENCOUNTER — TELEPHONE (OUTPATIENT)
Dept: FAMILY MEDICINE CLINIC | Facility: CLINIC | Age: 45
End: 2025-02-05

## 2025-02-05 VITALS
RESPIRATION RATE: 16 BRPM | HEART RATE: 63 BPM | DIASTOLIC BLOOD PRESSURE: 80 MMHG | SYSTOLIC BLOOD PRESSURE: 110 MMHG | HEIGHT: 64 IN | TEMPERATURE: 97.6 F | OXYGEN SATURATION: 95 % | WEIGHT: 184 LBS | BODY MASS INDEX: 31.41 KG/M2

## 2025-02-05 DIAGNOSIS — Z12.11 SCREENING FOR COLORECTAL CANCER: ICD-10-CM

## 2025-02-05 DIAGNOSIS — N88.8 MASS OF UTERINE CERVIX: ICD-10-CM

## 2025-02-05 DIAGNOSIS — Z12.12 SCREENING FOR COLORECTAL CANCER: ICD-10-CM

## 2025-02-05 DIAGNOSIS — N93.9 ABNORMAL UTERINE BLEEDING (AUB): Primary | ICD-10-CM

## 2025-02-05 DIAGNOSIS — Z01.419 WOMEN'S ANNUAL ROUTINE GYNECOLOGICAL EXAMINATION: ICD-10-CM

## 2025-02-05 PROCEDURE — 87591 N.GONORRHOEAE DNA AMP PROB: CPT

## 2025-02-05 PROCEDURE — G0476 HPV COMBO ASSAY CA SCREEN: HCPCS

## 2025-02-05 PROCEDURE — 99396 PREV VISIT EST AGE 40-64: CPT | Performed by: FAMILY MEDICINE

## 2025-02-05 PROCEDURE — 87491 CHLMYD TRACH DNA AMP PROBE: CPT

## 2025-02-05 PROCEDURE — G0145 SCR C/V CYTO,THINLAYER,RESCR: HCPCS

## 2025-02-06 ENCOUNTER — PREP FOR PROCEDURE (OUTPATIENT)
Age: 45
End: 2025-02-06

## 2025-02-06 ENCOUNTER — TELEPHONE (OUTPATIENT)
Age: 45
End: 2025-02-06

## 2025-02-06 DIAGNOSIS — Z12.11 SCREENING FOR COLON CANCER: Primary | ICD-10-CM

## 2025-02-06 LAB
HPV HR 12 DNA CVX QL NAA+PROBE: NEGATIVE
HPV16 DNA CVX QL NAA+PROBE: POSITIVE
HPV18 DNA CVX QL NAA+PROBE: NEGATIVE

## 2025-02-06 NOTE — TELEPHONE ENCOUNTER
02/06/25  Screened by: Italia Hernandez    Referring Provider     Pre- Screening:     There is no height or weight on file to calculate BMI.184lbs 31.58  Has patient been referred for a routine screening Colonoscopy? yes  Is the patient between 45-75 years old? yes      Previous Colonoscopy no   If yes:    Date:     Facility:     Reason:     Does the patient want to see a Gastroenterologist prior to their procedure OR are they having any GI symptoms? no    Has the patient been hospitalized or had abdominal surgery in the past 6 months? no    Does the patient use supplemental oxygen? no    Does the patient take Coumadin, Lovenox, Plavix, Elliquis, Xarelto, or other blood thinning medication? no    Has the patient had a stroke, cardiac event, or stent placed in the past year? no      If patient is between 45yrs - 49yrs, please advise patient that we will have to confirm benefits & coverage with their insurance company for a routine screening colonoscopy.

## 2025-02-06 NOTE — TELEPHONE ENCOUNTER
Scheduled date of colonoscopy (as of today):3/17/2025  Physician performing colonoscopy:Dr Vogt  Location of colonoscopy:[facility]  Bowel prep reviewed with patient:Miralax/Dulcolax  Instructions reviewed with patient by:sent via letter  Clearances: N/A

## 2025-02-06 NOTE — LETTER
Attached are your prep instructions for your upcoming procedure on 3/17/2025. If you have any questions or concerns please contact us at [phone].        Instrucciones de medicamentos para adultos con diabetes   que deben someterse a pat preparación intestinal.   Medicine Instructions for Adults with Diabetes who Need a Bowel Prep    Siga estas instrucciones cuando se requiera pat PREPARACIÓN INTESTINAL para yusuf procedimiento médico o cirugía.    NOTA:   Agonistas del receptor del GLP-1 que se destinee de forma semanal: no los tome por 7 días antes del procedimiento médico.   Inhibidores del SGLT-2: no los tome por 4 días antes del procedimiento médico.     El día previo a la cirugía o al procedimiento médico  Si va a someterse a un procedimiento médico (p. ej., pat colonoscopia) o pat cirugía que requiera pat preparación intestinal y puede adoptar pat dieta de líquidos kelsey, siga las instrucciones que se indican a continuación según el tipo de medicamento que ruddy para la diabetes.     Tipo de medicamento que ruddy Ejemplos Qué hacer   Insulina premezclada de acción intermedia Humalog® 75/25, Humulin® 70/30, NovoLog® 70/30, insulina normal. [place] la mitad de yusuf dosis normal la noche previa al procedimiento médico.   Insulina de acción rápida Humalog® U200, NovoLog®, Apidra®, Fiasp®. [place] la mitad de yusuf dosis normal la noche previa al procedimiento médico.   Insulina de acción prolongada Lantus®, Levemir®, Tresiba®, Toujeo®, Basaglar®. [place] yusuf dosis normal COMPLETA el día previo al procedimiento médico.   Sulfonilurea por vía oral Glipizida/Glimepirida/Glucotrol®. [place] la mitad de yusuf dosis normal la noche previa al procedimiento médico.   Otros medicamentos orales para la diabetes Metformin®, Glucophage®, Glucophage XR®, Riomet®, Glumetza®, Actose®, Avandia®, Glyset®, Prandin®. [place] yusuf dosis normal con la karan la noche previa al procedimiento médico.   Agonistas del receptor del GLP-1 AdlyxinÒ, ByettaÒ, BydureonÒ,  OzempicÒ, SoliquaÒ, TanzeumÒ, TrulicityÒ, VictozaÒ, Saxenda®, Rybelsus®, Wegovy® Si lo ruddy a diario, tómelo hamzah normalmente.  Si lo ruddy de forma semanal, no tome zara medicamento ethan 7 días antes del procedimiento, incluyendo ezekiel mismo día (reanude la ruddy después del procedimiento).   Inhibidores del SGLT-2 Jardiance®, Invokana®, Farxiga®,   Steglatro®, Brenzavvy®, Qtern®, Segluromet®, Glyxambi®, Synjardy®, Synjardy XR®, Invokamet®, Invokamet XR®, Trijary XR®, Xigduo XR®, [place]an®. No los tome ethan 4 días antes del procedimiento médico, incluyendo ezekiel mismo día (reanude la ruddy después del procedimiento).   En la parte de atrás, puede encontrar más información sobre “El día de la cirugía o el procedimiento médico”      Instrucciones de medicamentos para adultos con diabetes  que deben someterse a pat preparación intestinal.     Página 2      El día de la cirugía o el procedimiento médico  Siga las indicaciones que se encuentran a continuación según el tipo de medicamento que ruddy para tratar la diabetes.     Tipo de medicamento que ruddy Ejemplos Qué hacer   Insulina de acción prolongada Lantus®, Levemir®, Tresiba®, Toujeo®, Basaglar®, Semglee®.   Si normalmente ruddy insulina de acción prolongada a la mañana, tome la dosis completa según lo planificado.   Agonistas del receptor del GLP-1 AdlyxinÒ, ByettaÒ, BydureonÒ, OzempicÒ, SoliquaÒ, TanzeumÒ, TrulicityÒ, VictozaÒ, Saxenda®, Rybelsus®. NO tome zara medicamento el día del procedimiento médico (reanude la ruddy después).     A excepción de la ruddy matutina de la insulina de acción prolongada, NO tome [place] medicamento para la diabetes el día del procedimiento médico, a menos que se lo haya indicado el médico que los administra.    Continúe controlando mimi niveles de azúcar en shay.  Si tiene pat bomba de insulina, hable con yusuf endocrinólogo al menos 3 días antes del procedimiento médico y solicítele instrucciones. NOTA: Si no puede hablar con yusuf  endocrinólogo con antelación, el día de la intervención ajuste yusuf bomba de insulina solo a yusuf ritmo basal. Traiga los suministros de la bomba de insulina al hospital.     Si tiene alguna zaira sobre cómo lynnette mimi medicamentos para la diabetes antes del procedimiento médico, comuníquese con el médico que los administra.    Thank you,     [facility]

## 2025-02-07 LAB
C TRACH DNA SPEC QL NAA+PROBE: NEGATIVE
N GONORRHOEA DNA SPEC QL NAA+PROBE: NEGATIVE

## 2025-02-12 LAB
LAB AP GYN PRIMARY INTERPRETATION: NORMAL
Lab: NORMAL

## 2025-02-13 ENCOUNTER — TELEPHONE (OUTPATIENT)
Dept: FAMILY MEDICINE CLINIC | Facility: CLINIC | Age: 45
End: 2025-02-13

## 2025-02-13 NOTE — TELEPHONE ENCOUNTER
Called patient via [employer] team to discuss abnormal Pap smear.   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  According to [facility]P recommendations patient should undergo colposcopy for further evaluation.     Patient has an appointment coming up with gynecology on 3/10 to discuss an abnormal transvaginal ultrasonography finding, but patient asked to have the colposcopy done by this physician.     Will contact clerical team to schedule colposcopy as soon as possible.     Patient agreed with plan and all questions were answered to patient satisfaction

## 2025-02-24 ENCOUNTER — TELEPHONE (OUTPATIENT)
Dept: FAMILY MEDICINE CLINIC | Facility: CLINIC | Age: 45
End: 2025-02-24

## 2025-02-24 NOTE — TELEPHONE ENCOUNTER
Pt's colposcopy appt has been cancelled.     Please let us know when the ability for preceptors Maksim Kauffman, and Dr. Barrios will be available next in the office for this procedure. Pt has been informed of a return phone call once we know who will be assisting dr forrester with procedure.

## 2025-02-26 NOTE — TELEPHONE ENCOUNTER
Before I contact pt, will it be possible to know who will be available for Monday 3/3 and Friday 3/7 either morning or afternoon?

## 2025-02-28 NOTE — TELEPHONE ENCOUNTER
I will be around 3/3 in the afternoon, 3/5 in the afternoon, 3/6 in the afternoon and 3/7 in the afternoon

## 2025-03-03 NOTE — TELEPHONE ENCOUNTER
Sorry for the confusion. Ill be in the office 3/6. Can you schedule colposcopy for 3/6 in the afternoon? Please

## 2025-03-06 PROBLEM — R87.810 HUMAN PAPILLOMAVIRUS (HPV) TYPE 16 DNA DETECTED IN CERVICAL SPECIMEN: Status: ACTIVE | Noted: 2025-03-06

## 2025-07-22 ENCOUNTER — TELEPHONE (OUTPATIENT)
Dept: FAMILY MEDICINE CLINIC | Facility: CLINIC | Age: 45
End: 2025-07-22